# Patient Record
Sex: MALE | Race: WHITE | NOT HISPANIC OR LATINO | Employment: OTHER | URBAN - METROPOLITAN AREA
[De-identification: names, ages, dates, MRNs, and addresses within clinical notes are randomized per-mention and may not be internally consistent; named-entity substitution may affect disease eponyms.]

---

## 2018-01-04 ENCOUNTER — ANESTHESIA EVENT (OUTPATIENT)
Dept: SURGERY | Facility: CLINIC | Age: 78
DRG: 466 | End: 2018-01-04
Payer: MEDICARE

## 2018-01-04 RX ORDER — LOSARTAN POTASSIUM AND HYDROCHLOROTHIAZIDE 12.5; 1 MG/1; MG/1
1 TABLET ORAL DAILY
COMMUNITY

## 2018-01-04 RX ORDER — MULTIPLE VITAMINS W/ MINERALS TAB 9MG-400MCG
1 TAB ORAL DAILY
COMMUNITY

## 2018-01-05 ENCOUNTER — ANESTHESIA (OUTPATIENT)
Dept: SURGERY | Facility: CLINIC | Age: 78
DRG: 466 | End: 2018-01-05
Payer: MEDICARE

## 2018-01-05 ENCOUNTER — HOSPITAL ENCOUNTER (INPATIENT)
Facility: CLINIC | Age: 78
LOS: 3 days | Discharge: HOME HEALTH | DRG: 466 | End: 2018-01-08
Attending: ORTHOPAEDIC SURGERY | Admitting: ORTHOPAEDIC SURGERY
Payer: MEDICARE

## 2018-01-05 DIAGNOSIS — Z96.649 S/P REVISION OF TOTAL HIP: Primary | ICD-10-CM

## 2018-01-05 LAB
ABO + RH BLD: NORMAL
ABO + RH BLD: NORMAL
BLD GP AB SCN SERPL QL: NORMAL
BLOOD BANK CMNT PATIENT-IMP: NORMAL
POTASSIUM SERPL-SCNC: 3.5 MMOL/L (ref 3.4–5.3)
SPECIMEN EXP DATE BLD: NORMAL

## 2018-01-05 PROCEDURE — C1776 JOINT DEVICE (IMPLANTABLE): HCPCS | Performed by: ORTHOPAEDIC SURGERY

## 2018-01-05 PROCEDURE — 25000125 ZZHC RX 250: Performed by: ANESTHESIOLOGY

## 2018-01-05 PROCEDURE — 99233 SBSQ HOSP IP/OBS HIGH 50: CPT | Performed by: NURSE PRACTITIONER

## 2018-01-05 PROCEDURE — 25000566 ZZH SEVOFLURANE, EA 15 MIN: Performed by: ORTHOPAEDIC SURGERY

## 2018-01-05 PROCEDURE — 25000125 ZZHC RX 250: Performed by: ORTHOPAEDIC SURGERY

## 2018-01-05 PROCEDURE — 25000128 H RX IP 250 OP 636: Performed by: ANESTHESIOLOGY

## 2018-01-05 PROCEDURE — 25000128 H RX IP 250 OP 636: Performed by: NURSE ANESTHETIST, CERTIFIED REGISTERED

## 2018-01-05 PROCEDURE — 25000128 H RX IP 250 OP 636: Performed by: ORTHOPAEDIC SURGERY

## 2018-01-05 PROCEDURE — 86900 BLOOD TYPING SEROLOGIC ABO: CPT | Performed by: ANESTHESIOLOGY

## 2018-01-05 PROCEDURE — 25000125 ZZHC RX 250: Performed by: NURSE PRACTITIONER

## 2018-01-05 PROCEDURE — 71000012 ZZH RECOVERY PHASE 1 LEVEL 1 FIRST HR: Performed by: ORTHOPAEDIC SURGERY

## 2018-01-05 PROCEDURE — 0QU60KZ SUPPLEMENT RIGHT UPPER FEMUR WITH NONAUTOLOGOUS TISSUE SUBSTITUTE, OPEN APPROACH: ICD-10-PCS | Performed by: ORTHOPAEDIC SURGERY

## 2018-01-05 PROCEDURE — 36000067 ZZH SURGERY LEVEL 5 1ST 30 MIN: Performed by: ORTHOPAEDIC SURGERY

## 2018-01-05 PROCEDURE — 0QU40KZ SUPPLEMENT RIGHT ACETABULUM WITH NONAUTOLOGOUS TISSUE SUBSTITUTE, OPEN APPROACH: ICD-10-PCS | Performed by: ORTHOPAEDIC SURGERY

## 2018-01-05 PROCEDURE — 86901 BLOOD TYPING SEROLOGIC RH(D): CPT | Performed by: ANESTHESIOLOGY

## 2018-01-05 PROCEDURE — 27210794 ZZH OR GENERAL SUPPLY STERILE: Performed by: ORTHOPAEDIC SURGERY

## 2018-01-05 PROCEDURE — 0SUR09Z SUPPLEMENT RIGHT HIP JOINT, FEMORAL SURFACE WITH LINER, OPEN APPROACH: ICD-10-PCS | Performed by: ORTHOPAEDIC SURGERY

## 2018-01-05 PROCEDURE — 0QS604Z REPOSITION RIGHT UPPER FEMUR WITH INTERNAL FIXATION DEVICE, OPEN APPROACH: ICD-10-PCS | Performed by: ORTHOPAEDIC SURGERY

## 2018-01-05 PROCEDURE — 25000132 ZZH RX MED GY IP 250 OP 250 PS 637: Mod: GY | Performed by: NURSE PRACTITIONER

## 2018-01-05 PROCEDURE — 27210995 ZZH RX 272: Performed by: ORTHOPAEDIC SURGERY

## 2018-01-05 PROCEDURE — 0SP909Z REMOVAL OF LINER FROM RIGHT HIP JOINT, OPEN APPROACH: ICD-10-PCS | Performed by: ORTHOPAEDIC SURGERY

## 2018-01-05 PROCEDURE — 71000013 ZZH RECOVERY PHASE 1 LEVEL 1 EA ADDTL HR: Performed by: ORTHOPAEDIC SURGERY

## 2018-01-05 PROCEDURE — 86850 RBC ANTIBODY SCREEN: CPT | Performed by: ANESTHESIOLOGY

## 2018-01-05 PROCEDURE — 25000125 ZZHC RX 250: Performed by: NURSE ANESTHETIST, CERTIFIED REGISTERED

## 2018-01-05 PROCEDURE — 40000169 ZZH STATISTIC PRE-PROCEDURE ASSESSMENT I: Performed by: ORTHOPAEDIC SURGERY

## 2018-01-05 PROCEDURE — 25000132 ZZH RX MED GY IP 250 OP 250 PS 637: Mod: GY | Performed by: ORTHOPAEDIC SURGERY

## 2018-01-05 PROCEDURE — C1713 ANCHOR/SCREW BN/BN,TIS/BN: HCPCS | Performed by: ORTHOPAEDIC SURGERY

## 2018-01-05 PROCEDURE — 84132 ASSAY OF SERUM POTASSIUM: CPT | Performed by: ANESTHESIOLOGY

## 2018-01-05 PROCEDURE — 36415 COLL VENOUS BLD VENIPUNCTURE: CPT | Performed by: ANESTHESIOLOGY

## 2018-01-05 PROCEDURE — P9041 ALBUMIN (HUMAN),5%, 50ML: HCPCS | Performed by: NURSE ANESTHETIST, CERTIFIED REGISTERED

## 2018-01-05 PROCEDURE — 37000008 ZZH ANESTHESIA TECHNICAL FEE, 1ST 30 MIN: Performed by: ORTHOPAEDIC SURGERY

## 2018-01-05 PROCEDURE — 36000069 ZZH SURGERY LEVEL 5 EA 15 ADDTL MIN: Performed by: ORTHOPAEDIC SURGERY

## 2018-01-05 PROCEDURE — 12000007 ZZH R&B INTERMEDIATE

## 2018-01-05 PROCEDURE — 37000009 ZZH ANESTHESIA TECHNICAL FEE, EACH ADDTL 15 MIN: Performed by: ORTHOPAEDIC SURGERY

## 2018-01-05 PROCEDURE — C1762 CONN TISS, HUMAN(INC FASCIA): HCPCS | Performed by: ORTHOPAEDIC SURGERY

## 2018-01-05 PROCEDURE — A9270 NON-COVERED ITEM OR SERVICE: HCPCS | Mod: GY | Performed by: ORTHOPAEDIC SURGERY

## 2018-01-05 DEVICE — IMP HEAD FEMORAL BIOM MOD 36MM -3  11-363661: Type: IMPLANTABLE DEVICE | Site: HIP | Status: FUNCTIONAL

## 2018-01-05 DEVICE — IMP CABLE ACCORD GRIP 2MM FOR GRIP PLATES 71340020: Type: IMPLANTABLE DEVICE | Site: HIP | Status: FUNCTIONAL

## 2018-01-05 DEVICE — IMPLANTABLE DEVICE: Type: IMPLANTABLE DEVICE | Site: HIP | Status: FUNCTIONAL

## 2018-01-05 DEVICE — GRAFT BONE CRUSH CANC 30ML 400080: Type: IMPLANTABLE DEVICE | Site: HIP | Status: FUNCTIONAL

## 2018-01-05 DEVICE — GRAFT BONE PUTTY DBX 05ML 038050: Type: IMPLANTABLE DEVICE | Site: HIP | Status: FUNCTIONAL

## 2018-01-05 DEVICE — GRAFT BONE STRUT CORTICAL 10CMX20MM 400610: Type: IMPLANTABLE DEVICE | Site: HIP | Status: FUNCTIONAL

## 2018-01-05 RX ORDER — ACETAMINOPHEN 325 MG/1
975 TABLET ORAL EVERY 8 HOURS
Status: COMPLETED | OUTPATIENT
Start: 2018-01-05 | End: 2018-01-08

## 2018-01-05 RX ORDER — TRIAMCINOLONE ACETONIDE 1 MG/G
CREAM TOPICAL 2 TIMES DAILY PRN
Status: DISCONTINUED | OUTPATIENT
Start: 2018-01-05 | End: 2018-01-08 | Stop reason: HOSPADM

## 2018-01-05 RX ORDER — CEFAZOLIN SODIUM 1 G
1 VIAL (EA) INJECTION SEE ADMIN INSTRUCTIONS
Status: DISCONTINUED | OUTPATIENT
Start: 2018-01-05 | End: 2018-01-05 | Stop reason: HOSPADM

## 2018-01-05 RX ORDER — NEOSTIGMINE METHYLSULFATE 1 MG/ML
VIAL (ML) INJECTION PRN
Status: DISCONTINUED | OUTPATIENT
Start: 2018-01-05 | End: 2018-01-05

## 2018-01-05 RX ORDER — NALOXONE HYDROCHLORIDE 0.4 MG/ML
.1-.4 INJECTION, SOLUTION INTRAMUSCULAR; INTRAVENOUS; SUBCUTANEOUS
Status: ACTIVE | OUTPATIENT
Start: 2018-01-05 | End: 2018-01-06

## 2018-01-05 RX ORDER — DICLOFENAC SODIUM 1 MG/ML
1 SOLUTION/ DROPS OPHTHALMIC 4 TIMES DAILY PRN
Status: DISCONTINUED | OUTPATIENT
Start: 2018-01-05 | End: 2018-01-06

## 2018-01-05 RX ORDER — CICLOPIROX OLAMINE 7.7 MG/G
CREAM TOPICAL DAILY PRN
COMMUNITY

## 2018-01-05 RX ORDER — SODIUM CHLORIDE, SODIUM LACTATE, POTASSIUM CHLORIDE, CALCIUM CHLORIDE 600; 310; 30; 20 MG/100ML; MG/100ML; MG/100ML; MG/100ML
INJECTION, SOLUTION INTRAVENOUS CONTINUOUS
Status: DISCONTINUED | OUTPATIENT
Start: 2018-01-05 | End: 2018-01-05 | Stop reason: HOSPADM

## 2018-01-05 RX ORDER — FENTANYL CITRATE 50 UG/ML
25-100 INJECTION, SOLUTION INTRAMUSCULAR; INTRAVENOUS
Status: DISCONTINUED | OUTPATIENT
Start: 2018-01-05 | End: 2018-01-05 | Stop reason: HOSPADM

## 2018-01-05 RX ORDER — ONDANSETRON 2 MG/ML
4 INJECTION INTRAMUSCULAR; INTRAVENOUS EVERY 6 HOURS PRN
Status: DISCONTINUED | OUTPATIENT
Start: 2018-01-05 | End: 2018-01-08 | Stop reason: HOSPADM

## 2018-01-05 RX ORDER — AMLODIPINE BESYLATE 10 MG/1
10 TABLET ORAL EVERY EVENING
Status: DISCONTINUED | OUTPATIENT
Start: 2018-01-05 | End: 2018-01-08 | Stop reason: HOSPADM

## 2018-01-05 RX ORDER — GLYCOPYRROLATE 0.2 MG/ML
INJECTION, SOLUTION INTRAMUSCULAR; INTRAVENOUS PRN
Status: DISCONTINUED | OUTPATIENT
Start: 2018-01-05 | End: 2018-01-05

## 2018-01-05 RX ORDER — ALBUMIN, HUMAN INJ 5% 5 %
SOLUTION INTRAVENOUS CONTINUOUS PRN
Status: DISCONTINUED | OUTPATIENT
Start: 2018-01-05 | End: 2018-01-05

## 2018-01-05 RX ORDER — ONDANSETRON 4 MG/1
4 TABLET, ORALLY DISINTEGRATING ORAL EVERY 30 MIN PRN
Status: DISCONTINUED | OUTPATIENT
Start: 2018-01-05 | End: 2018-01-05 | Stop reason: HOSPADM

## 2018-01-05 RX ORDER — PROPOFOL 10 MG/ML
INJECTION, EMULSION INTRAVENOUS PRN
Status: DISCONTINUED | OUTPATIENT
Start: 2018-01-05 | End: 2018-01-05

## 2018-01-05 RX ORDER — MAGNESIUM HYDROXIDE 1200 MG/15ML
LIQUID ORAL PRN
Status: DISCONTINUED | OUTPATIENT
Start: 2018-01-05 | End: 2018-01-05 | Stop reason: HOSPADM

## 2018-01-05 RX ORDER — MOMETASONE FUROATE 1 MG/G
CREAM TOPICAL 2 TIMES DAILY PRN
Status: DISCONTINUED | OUTPATIENT
Start: 2018-01-05 | End: 2018-01-08 | Stop reason: HOSPADM

## 2018-01-05 RX ORDER — LABETALOL HYDROCHLORIDE 5 MG/ML
10 INJECTION, SOLUTION INTRAVENOUS
Status: DISCONTINUED | OUTPATIENT
Start: 2018-01-05 | End: 2018-01-05 | Stop reason: HOSPADM

## 2018-01-05 RX ORDER — NALOXONE HYDROCHLORIDE 0.4 MG/ML
.1-.4 INJECTION, SOLUTION INTRAMUSCULAR; INTRAVENOUS; SUBCUTANEOUS
Status: DISCONTINUED | OUTPATIENT
Start: 2018-01-05 | End: 2018-01-08 | Stop reason: HOSPADM

## 2018-01-05 RX ORDER — DEXAMETHASONE SODIUM PHOSPHATE 4 MG/ML
INJECTION, SOLUTION INTRA-ARTICULAR; INTRALESIONAL; INTRAMUSCULAR; INTRAVENOUS; SOFT TISSUE PRN
Status: DISCONTINUED | OUTPATIENT
Start: 2018-01-05 | End: 2018-01-05

## 2018-01-05 RX ORDER — FENTANYL CITRATE 50 UG/ML
25-50 INJECTION, SOLUTION INTRAMUSCULAR; INTRAVENOUS
Status: DISCONTINUED | OUTPATIENT
Start: 2018-01-05 | End: 2018-01-05 | Stop reason: HOSPADM

## 2018-01-05 RX ORDER — SODIUM CHLORIDE, SODIUM LACTATE, POTASSIUM CHLORIDE, CALCIUM CHLORIDE 600; 310; 30; 20 MG/100ML; MG/100ML; MG/100ML; MG/100ML
INJECTION, SOLUTION INTRAVENOUS CONTINUOUS
Status: DISCONTINUED | OUTPATIENT
Start: 2018-01-05 | End: 2018-01-06

## 2018-01-05 RX ORDER — HYDROMORPHONE HYDROCHLORIDE 1 MG/ML
.3-.5 INJECTION, SOLUTION INTRAMUSCULAR; INTRAVENOUS; SUBCUTANEOUS
Status: DISCONTINUED | OUTPATIENT
Start: 2018-01-05 | End: 2018-01-08 | Stop reason: HOSPADM

## 2018-01-05 RX ORDER — PROPARACAINE HYDROCHLORIDE 5 MG/ML
1 SOLUTION/ DROPS OPHTHALMIC ONCE
Status: DISCONTINUED | OUTPATIENT
Start: 2018-01-05 | End: 2018-01-05

## 2018-01-05 RX ORDER — EPHEDRINE SULFATE 50 MG/ML
INJECTION, SOLUTION INTRAMUSCULAR; INTRAVENOUS; SUBCUTANEOUS PRN
Status: DISCONTINUED | OUTPATIENT
Start: 2018-01-05 | End: 2018-01-05

## 2018-01-05 RX ORDER — CEFAZOLIN SODIUM 2 G/100ML
2 INJECTION, SOLUTION INTRAVENOUS
Status: COMPLETED | OUTPATIENT
Start: 2018-01-05 | End: 2018-01-05

## 2018-01-05 RX ORDER — METOPROLOL SUCCINATE 25 MG/1
25 TABLET, EXTENDED RELEASE ORAL DAILY
Status: DISCONTINUED | OUTPATIENT
Start: 2018-01-06 | End: 2018-01-08 | Stop reason: HOSPADM

## 2018-01-05 RX ORDER — HYDROXYZINE HYDROCHLORIDE 10 MG/1
10 TABLET, FILM COATED ORAL EVERY 6 HOURS PRN
Status: DISCONTINUED | OUTPATIENT
Start: 2018-01-05 | End: 2018-01-08 | Stop reason: HOSPADM

## 2018-01-05 RX ORDER — ACETAMINOPHEN 325 MG/1
975 TABLET ORAL EVERY 8 HOURS
Status: DISCONTINUED | OUTPATIENT
Start: 2018-01-05 | End: 2018-01-05

## 2018-01-05 RX ORDER — LIDOCAINE HYDROCHLORIDE 20 MG/ML
INJECTION, SOLUTION INFILTRATION; PERINEURAL PRN
Status: DISCONTINUED | OUTPATIENT
Start: 2018-01-05 | End: 2018-01-05

## 2018-01-05 RX ORDER — TRIAMCINOLONE ACETONIDE 1 MG/G
CREAM TOPICAL 2 TIMES DAILY PRN
COMMUNITY

## 2018-01-05 RX ORDER — VECURONIUM BROMIDE 1 MG/ML
INJECTION, POWDER, LYOPHILIZED, FOR SOLUTION INTRAVENOUS PRN
Status: DISCONTINUED | OUTPATIENT
Start: 2018-01-05 | End: 2018-01-05

## 2018-01-05 RX ORDER — HYDROMORPHONE HYDROCHLORIDE 1 MG/ML
.3-.5 INJECTION, SOLUTION INTRAMUSCULAR; INTRAVENOUS; SUBCUTANEOUS EVERY 5 MIN PRN
Status: DISCONTINUED | OUTPATIENT
Start: 2018-01-05 | End: 2018-01-05 | Stop reason: HOSPADM

## 2018-01-05 RX ORDER — AMOXICILLIN 250 MG
1-2 CAPSULE ORAL 2 TIMES DAILY
Status: DISCONTINUED | OUTPATIENT
Start: 2018-01-05 | End: 2018-01-08 | Stop reason: HOSPADM

## 2018-01-05 RX ORDER — ONDANSETRON 2 MG/ML
4 INJECTION INTRAMUSCULAR; INTRAVENOUS EVERY 30 MIN PRN
Status: DISCONTINUED | OUTPATIENT
Start: 2018-01-05 | End: 2018-01-05 | Stop reason: HOSPADM

## 2018-01-05 RX ORDER — ATORVASTATIN CALCIUM 40 MG/1
40 TABLET, FILM COATED ORAL EVERY EVENING
Status: DISCONTINUED | OUTPATIENT
Start: 2018-01-05 | End: 2018-01-08 | Stop reason: HOSPADM

## 2018-01-05 RX ORDER — ERYTHROMYCIN 5 MG/G
OINTMENT OPHTHALMIC 4 TIMES DAILY
Status: DISPENSED | OUTPATIENT
Start: 2018-01-05 | End: 2018-01-06

## 2018-01-05 RX ORDER — ONDANSETRON 4 MG/1
4 TABLET, ORALLY DISINTEGRATING ORAL EVERY 6 HOURS PRN
Status: DISCONTINUED | OUTPATIENT
Start: 2018-01-05 | End: 2018-01-08 | Stop reason: HOSPADM

## 2018-01-05 RX ORDER — LIDOCAINE 40 MG/G
CREAM TOPICAL
Status: DISCONTINUED | OUTPATIENT
Start: 2018-01-05 | End: 2018-01-08 | Stop reason: HOSPADM

## 2018-01-05 RX ORDER — HYDROMORPHONE HYDROCHLORIDE 2 MG/1
2-4 TABLET ORAL EVERY 4 HOURS PRN
Status: DISCONTINUED | OUTPATIENT
Start: 2018-01-05 | End: 2018-01-08 | Stop reason: HOSPADM

## 2018-01-05 RX ORDER — ACETAMINOPHEN 325 MG/1
650 TABLET ORAL EVERY 4 HOURS PRN
Status: DISCONTINUED | OUTPATIENT
Start: 2018-01-08 | End: 2018-01-08 | Stop reason: HOSPADM

## 2018-01-05 RX ORDER — ONDANSETRON 2 MG/ML
INJECTION INTRAMUSCULAR; INTRAVENOUS PRN
Status: DISCONTINUED | OUTPATIENT
Start: 2018-01-05 | End: 2018-01-05

## 2018-01-05 RX ORDER — LOSARTAN POTASSIUM AND HYDROCHLOROTHIAZIDE 12.5; 1 MG/1; MG/1
1 TABLET ORAL DAILY
Status: DISCONTINUED | OUTPATIENT
Start: 2018-01-06 | End: 2018-01-08 | Stop reason: HOSPADM

## 2018-01-05 RX ORDER — FENTANYL CITRATE 50 UG/ML
INJECTION, SOLUTION INTRAMUSCULAR; INTRAVENOUS PRN
Status: DISCONTINUED | OUTPATIENT
Start: 2018-01-05 | End: 2018-01-05

## 2018-01-05 RX ORDER — MOMETASONE FUROATE 1 MG/G
CREAM TOPICAL 2 TIMES DAILY PRN
COMMUNITY

## 2018-01-05 RX ADMIN — VECURONIUM BROMIDE 3 MG: 1 INJECTION, POWDER, LYOPHILIZED, FOR SOLUTION INTRAVENOUS at 15:26

## 2018-01-05 RX ADMIN — HYDROMORPHONE HYDROCHLORIDE 0.5 MG: 1 INJECTION, SOLUTION INTRAMUSCULAR; INTRAVENOUS; SUBCUTANEOUS at 14:55

## 2018-01-05 RX ADMIN — TRANEXAMIC ACID 1 G: 100 INJECTION, SOLUTION INTRAVENOUS at 13:42

## 2018-01-05 RX ADMIN — TRANEXAMIC ACID 1 G: 100 INJECTION, SOLUTION INTRAVENOUS at 16:37

## 2018-01-05 RX ADMIN — SODIUM CHLORIDE, POTASSIUM CHLORIDE, SODIUM LACTATE AND CALCIUM CHLORIDE: 600; 310; 30; 20 INJECTION, SOLUTION INTRAVENOUS at 14:21

## 2018-01-05 RX ADMIN — Medication 0.5 MG: at 17:37

## 2018-01-05 RX ADMIN — Medication 5 MG: at 14:04

## 2018-01-05 RX ADMIN — DEXAMETHASONE SODIUM PHOSPHATE 4 MG: 4 INJECTION, SOLUTION INTRA-ARTICULAR; INTRALESIONAL; INTRAMUSCULAR; INTRAVENOUS; SOFT TISSUE at 14:23

## 2018-01-05 RX ADMIN — CEFAZOLIN SODIUM 2 G: 2 INJECTION, SOLUTION INTRAVENOUS at 13:38

## 2018-01-05 RX ADMIN — SODIUM CHLORIDE, POTASSIUM CHLORIDE, SODIUM LACTATE AND CALCIUM CHLORIDE: 600; 310; 30; 20 INJECTION, SOLUTION INTRAVENOUS at 11:55

## 2018-01-05 RX ADMIN — VECURONIUM BROMIDE 2 MG: 1 INJECTION, POWDER, LYOPHILIZED, FOR SOLUTION INTRAVENOUS at 15:00

## 2018-01-05 RX ADMIN — ROCURONIUM BROMIDE 50 MG: 10 INJECTION INTRAVENOUS at 13:38

## 2018-01-05 RX ADMIN — LIDOCAINE HYDROCHLORIDE 1 ML: 10 INJECTION, SOLUTION EPIDURAL; INFILTRATION; INTRACAUDAL; PERINEURAL at 11:55

## 2018-01-05 RX ADMIN — PHENYLEPHRINE HYDROCHLORIDE 100 MCG: 10 INJECTION INTRAVENOUS at 13:50

## 2018-01-05 RX ADMIN — DEXTRAN 70, AND HYPROMELLOSE 2910 1 DROP: 1; 3 SOLUTION/ DROPS OPHTHALMIC at 22:00

## 2018-01-05 RX ADMIN — PROPOFOL 200 MG: 10 INJECTION, EMULSION INTRAVENOUS at 13:38

## 2018-01-05 RX ADMIN — SODIUM CHLORIDE, POTASSIUM CHLORIDE, SODIUM LACTATE AND CALCIUM CHLORIDE: 600; 310; 30; 20 INJECTION, SOLUTION INTRAVENOUS at 22:58

## 2018-01-05 RX ADMIN — GLYCOPYRROLATE 0.8 MG: 0.2 INJECTION, SOLUTION INTRAMUSCULAR; INTRAVENOUS at 16:53

## 2018-01-05 RX ADMIN — FENTANYL CITRATE 50 MCG: 50 INJECTION, SOLUTION INTRAMUSCULAR; INTRAVENOUS at 13:38

## 2018-01-05 RX ADMIN — Medication 0.5 MG: at 18:10

## 2018-01-05 RX ADMIN — ALBUMIN (HUMAN): 12.5 SOLUTION INTRAVENOUS at 15:37

## 2018-01-05 RX ADMIN — CEFAZOLIN SODIUM 2 G: 2 SOLUTION INTRAVENOUS at 22:53

## 2018-01-05 RX ADMIN — FENTANYL CITRATE 50 MCG: 50 INJECTION, SOLUTION INTRAMUSCULAR; INTRAVENOUS at 14:05

## 2018-01-05 RX ADMIN — LIDOCAINE HYDROCHLORIDE 100 MG: 20 INJECTION, SOLUTION INFILTRATION; PERINEURAL at 13:38

## 2018-01-05 RX ADMIN — MIDAZOLAM 1 MG: 1 INJECTION INTRAMUSCULAR; INTRAVENOUS at 13:28

## 2018-01-05 RX ADMIN — FENTANYL CITRATE 50 MCG: 50 INJECTION, SOLUTION INTRAMUSCULAR; INTRAVENOUS at 16:09

## 2018-01-05 RX ADMIN — SENNOSIDES AND DOCUSATE SODIUM 1 TABLET: 8.6; 5 TABLET ORAL at 21:51

## 2018-01-05 RX ADMIN — ALBUMIN (HUMAN): 12.5 SOLUTION INTRAVENOUS at 15:23

## 2018-01-05 RX ADMIN — AMLODIPINE BESYLATE 10 MG: 10 TABLET ORAL at 21:51

## 2018-01-05 RX ADMIN — PHENYLEPHRINE HYDROCHLORIDE 100 MCG: 10 INJECTION INTRAVENOUS at 13:57

## 2018-01-05 RX ADMIN — ASPIRIN 325 MG: 325 TABLET, DELAYED RELEASE ORAL at 21:51

## 2018-01-05 RX ADMIN — PHENYLEPHRINE HYDROCHLORIDE 100 MCG: 10 INJECTION INTRAVENOUS at 14:19

## 2018-01-05 RX ADMIN — NEOSTIGMINE METHYLSULFATE 5 MG: 1 INJECTION INTRAMUSCULAR; INTRAVENOUS; SUBCUTANEOUS at 16:53

## 2018-01-05 RX ADMIN — CEFAZOLIN 1 G: 1 INJECTION, POWDER, FOR SOLUTION INTRAMUSCULAR; INTRAVENOUS at 15:33

## 2018-01-05 RX ADMIN — VECURONIUM BROMIDE 2 MG: 1 INJECTION, POWDER, LYOPHILIZED, FOR SOLUTION INTRAVENOUS at 14:30

## 2018-01-05 RX ADMIN — SODIUM CHLORIDE, POTASSIUM CHLORIDE, SODIUM LACTATE AND CALCIUM CHLORIDE: 600; 310; 30; 20 INJECTION, SOLUTION INTRAVENOUS at 16:33

## 2018-01-05 RX ADMIN — ACETAMINOPHEN 975 MG: 325 TABLET, FILM COATED ORAL at 21:51

## 2018-01-05 RX ADMIN — ERYTHROMYCIN 1 G: 5 OINTMENT OPHTHALMIC at 22:54

## 2018-01-05 RX ADMIN — MIDAZOLAM 1 MG: 1 INJECTION INTRAMUSCULAR; INTRAVENOUS at 13:34

## 2018-01-05 RX ADMIN — ONDANSETRON 4 MG: 2 INJECTION INTRAMUSCULAR; INTRAVENOUS at 16:36

## 2018-01-05 RX ADMIN — PHENYLEPHRINE HYDROCHLORIDE 0.5 MCG/KG/MIN: 10 INJECTION, SOLUTION INTRAMUSCULAR; INTRAVENOUS; SUBCUTANEOUS at 14:25

## 2018-01-05 RX ADMIN — VECURONIUM BROMIDE 3 MG: 1 INJECTION, POWDER, LYOPHILIZED, FOR SOLUTION INTRAVENOUS at 16:01

## 2018-01-05 RX ADMIN — Medication 10 MG: at 15:36

## 2018-01-05 RX ADMIN — ATORVASTATIN CALCIUM 40 MG: 40 TABLET, FILM COATED ORAL at 21:51

## 2018-01-05 RX ADMIN — Medication 5 MG: at 14:12

## 2018-01-05 ASSESSMENT — ACTIVITIES OF DAILY LIVING (ADL): RETIRED_COMMUNICATION: 0-->UNDERSTANDS/COMMUNICATES WITHOUT DIFFICULTY

## 2018-01-05 ASSESSMENT — LIFESTYLE VARIABLES: TOBACCO_USE: 1

## 2018-01-05 NOTE — IP AVS SNAPSHOT
[address] [facility]    [address] [place] [address]    Phone:  [phone]                                       After Visit Summary   1/5/2018    Mitch Gabriel    MRN: [id number]           After Visit Summary Signature Page     I have received my discharge instructions, and my questions have been answered. I have discussed any challenges I see with this plan with the nurse or doctor.    ..........................................................................................................................................  Patient/Patient Representative Signature      ..........................................................................................................................................  Patient Representative Print Name and Relationship to Patient    ..................................................               ................................................  Date                                            Time    ..........................................................................................................................................  Reviewed by Signature/Title    ...................................................              ..............................................  Date                                                            Time

## 2018-01-05 NOTE — PROGRESS NOTES
Admission medication history interview status for the 1/5/2018  admission is complete. See EPIC admission navigator for prior to admission medications     Medication history source reliability:Good    Medication history interview source(s):Patient    Medication history resources (including written lists, pill bottles, clinic record):Patient mailed in his medication list prior to surgery    Primary pharmacy.[facility]    Additional medication history information not noted on PTA med list :None    Time spent in this activity: 40 minutes    Prior to Admission medications    Medication Sig Last Dose Taking? Auth Provider   CALCIUM PO Take 250 mg by mouth daily 12/30/2017 at AM Yes Reported, Patient   triamcinolone (KENALOG) 0.1 % cream Apply topically 2 times daily as needed for irritation 12/29/2017 at PRN Yes Reported, Patient   Acetaminophen (TYLENOL ARTHRITIS PAIN PO) Take 1,300 mg by mouth 3 times daily as needed 12/30/2017 at PRN Yes Reported, Patient   ciclopirox (LOPROX) 0.77 % cream Apply topically daily as needed 12/29/2017 at PRN Yes Reported, Patient   mometasone (ELOCON) 0.1 % cream Apply topically 2 times daily as needed 12/29/2017 at PRN Yes Reported, Patient   Atorvastatin Calcium (LIPITOR PO) Take 40 mg by mouth every evening  1/4/2018 at PM Yes Reported, Patient   Metoprolol Succinate (TOPROL XL PO) Take 25 mg by mouth daily 1/5/2018 at 0730 Yes Reported, Patient   losartan-hydrochlorothiazide (HYZAAR) 100-12.5 MG per tablet Take 1 tablet by mouth daily 1/5/2018 at 0730 Yes Reported, Patient   AmLODIPine Besylate (NORVASC PO) Take 10 mg by mouth every evening  1/4/2018 at PM Yes Reported, Patient   ASPIRIN PO Take 81 mg by mouth daily 12/29/2017 at AM Yes Reported, Patient   VITAMIN D, CHOLECALCIFEROL, PO Take 5,000 Units by mouth daily 12/29/2017 at AM Yes Reported, Patient   multivitamin, therapeutic with minerals (MULTI-VITAMIN) TABS tablet Take 1 tablet by mouth daily 12/29/2017 at AM Yes  Reported, Patient   Multiple Vitamins-Minerals (PRESERVISION/LUTEIN PO) Take 2 tablets by mouth daily 12/29/2017 at AM Yes Reported, Patient   Sildenafil Citrate (VIAGRA PO) Take 100 mg by mouth daily as needed Past Month at PRN Yes Reported, Patient   Omega-3 Fatty Acids (FISH OIL PO) Take 360 mg by mouth daily 12/29/2017 at AM Yes Reported, Patient

## 2018-01-05 NOTE — IP AVS SNAPSHOT
MRN:[id number]                      After Visit Summary   1/5/2018    Mitch Gabriel    MRN: [id number]           Thank you!     Thank you for choosing [facility] for your care. Our goal is always to provide you with excellent care. Hearing back from our patients is one way we can continue to improve our services. Please take a few minutes to complete the written survey that you may receive in the mail after you visit with us. Thank you!        Patient Information     Date Of Birth          1940        Designated Caregiver       Most Recent Value    Caregiver    Will someone help with your care after discharge? yes    Name of designated caregiver Swapnil (wife)     Phone number of caregiver     Caregiver address [facility]      About your hospital stay     You were admitted on:  January 5, 2018 You last received care in the:  [place] [address] [facility]    You were discharged on:  January 8, 2018        Reason for your hospital stay       Patient admitted s/p Rev RTHA. He progressed well throughout his stay in the hospital and worked well with physical therapy. His pain was managed with po medications and he received 24 hours of IV abx post procedure.                  Who to Call     For medical emergencies, please call 911.  For non-urgent questions about your medical care, please call your primary care provider or clinic, [phone]  For questions related to your surgery, please call your surgery clinic        Attending Provider     Provider Specialty    Sherri Cabrera MD Orthopedics       Primary Care Provider Office Phone # Fax #    Aguilar Wesley [phone]      After Care Instructions     Diet       Follow this diet upon discharge: Orders Placed This Encounter      Advance Diet as Tolerated: Regular Diet Adult            Discharge Instructions       Patient is to slowly progress back into their activities over the next several weeks.    They are to keep the [place] dressing in place at all times and do not remove it. You can shower but try and keep the incision site covered and do not get it wet. Do not at any point in time, submerge the incision or soak the incision. Wear TENZIN stockings for total of 3 weeks after surgery.  Monitor the wound closely for any foul smelling or abnormal discharge. Any temperatures over 101.5 with chills, or any redness about the incision please contact our office immediately. If you experience any chest pains, or shortness of breath, go directly to the emergency department.   Follow up with Dr. Cabrera in approximately 2 weeks and call if you have any complaints between now and your follow up.   Call [phone] to schedule your follow up if you do not already have one scheduled.   Maintain hip precautions with no hip abduction until we clear you to do so. Wear the hip abduction brace at all times except when you are sleeping.   Maintain the toe touch weight bearing status for at least 6 weeks.   Make sure that you have someone with you when you get up and move around the house and make sure you use some form of ambulatory assistance at all times, preferably a walker.   Take aspirin for prevention of blood clots  Take dilaudid for pain control  Take senokot to help with constipation  Take atarax to help with spasms/itching/relaxation  Take zofran to help with nausea                  Follow-up Appointments     Follow-up and recommended labs and tests        Patient already has a follow up scheduled with us on 1/17/18. He will call if he has any complaints between now and then.                  Additional Services     Home Care PT Referral for Hospital Discharge       PT to eval and treat    Your provider has ordered home care - physical therapy. If you have not been contacted within 2 days of your discharge please call the department phone number listed on the top of this document.            Home Care PT Referral  "for Hospital Discharge       PT to eval and treat    Your provider has ordered home care - physical therapy. If you have not been contacted within 2 days of your discharge please call the department phone number listed on the top of this document.                  Pending Results     No orders found from 1/3/2018 to 2018.            Statement of Approval     Ordered          18 0756  I have reviewed and agree with all the recommendations and orders detailed in this document.     Approved and electronically signed by:  Jenaro Recinos PA-C             Admission Information     Date & Time Provider Department Dept. Phone    2018 Sherri Cabrera MD [place] [address] [facility] [phone]      Your Vitals Were     Blood Pressure Pulse Temperature Respirations Height Weight    117/68 (BP Location: Right arm) 67 98  F (36.7  C) 16 1.791 m (5' 10.5\") 96.3 kg (212 lb 3.2 oz)    Pulse Oximetry BMI (Body Mass Index)                95% 30.02 kg/m2          [email or web address] Information     [employer] lets you send messages to your doctor, view your test results, renew your prescriptions, schedule appointments and more. To sign up, go to [email or web address] . Click on \"Log in\" on the left side of the screen, which will take you to the Welcome page. Then click on \"Sign up Now\" on the right side of the page.     You will be asked to enter the access code listed below, as well as some personal information. Please follow the directions to create your username and password.     Your access code is: [email or web address]  Expires: 2018  9:49 AM     Your access code will  in 90 days. If you need help or a new code, please call your [facility] clinic or [phone].        Care EveryWhere ID     This is your Care EveryWhere ID. This could be used by other organizations to access your [facility] medical records  [id number]        Equal Access to Services     BOBY RODRIGUEZ AH: Kinsey Ellis, " waanaliliada lurichadaha, qaybta kaalveronika vo, ana maria mccormickaan ah. So [facility] [phone].    ATENCIÓN: Si louis jain, tiene a castillo disposición servicios gratuitos de asistencia lingüística. Dominique al [phone].    We comply with applicable federal civil rights laws and [place] laws. We do not discriminate on the basis of race, color, national origin, age, disability, sex, sexual orientation, or gender identity.               Review of your medicines      START taking        Dose / Directions    HYDROmorphone 2 MG tablet   Commonly known as:  DILAUDID        Dose:  2-4 mg   Take 1-2 tablets (2-4 mg) by mouth every 4 hours as needed for moderate to severe pain   Quantity:  40 tablet   Refills:  0       hydrOXYzine 10 MG tablet   Commonly known as:  ATARAX   Notes to Patient:  Resume after discharge        Dose:  10 mg   Take 1 tablet (10 mg) by mouth every 6 hours as needed for itching   Quantity:  30 tablet   Refills:  0       ondansetron 4 MG ODT tab   Commonly known as:  ZOFRAN-ODT        Dose:  4 mg   Take 1 tablet (4 mg) by mouth every 6 hours as needed for nausea or vomiting   Quantity:  30 tablet   Refills:  0       senna-docusate 8.6-50 MG per tablet   Commonly known as:  SENOKOT-S;PERICOLACE        Dose:  1-2 tablet   Take 1-2 tablets by mouth 2 times daily   Quantity:  40 tablet   Refills:  0         CONTINUE these medicines which may have CHANGED, or have new prescriptions. If we are uncertain of the size of tablets/capsules you have at home, strength may be listed as something that might have changed.        Dose / Directions    aspirin 325 MG EC tablet   This may have changed:    - medication strength  - how much to take  - when to take this        Dose:  325 mg   Take 1 tablet (325 mg) by mouth 2 times daily   Quantity:  60 tablet   Refills:  0         CONTINUE these medicines which have NOT CHANGED        Dose / Directions    CALCIUM PO   Notes to Patient:  Resume after discharge         Dose:  250 mg   Take 250 mg by mouth daily   Refills:  0       ciclopirox 0.77 % cream   Commonly known as:  LOPROX   Notes to Patient:  Resume after discharge        Apply topically daily as needed   Refills:  0       FISH OIL PO   Notes to Patient:  Resume after discharge        Dose:  360 mg   Take 360 mg by mouth daily   Refills:  0       LIPITOR PO        Dose:  40 mg   Take 40 mg by mouth every evening   Refills:  0       losartan-hydrochlorothiazide 100-12.5 MG per tablet   Commonly known as:  HYZAAR        Dose:  1 tablet   Take 1 tablet by mouth daily   Refills:  0       mometasone 0.1 % cream   Commonly known as:  ELOCON   Notes to Patient:  Resume after discharge        Apply topically 2 times daily as needed   Refills:  0       * Multi-vitamin Tabs tablet   Notes to Patient:  Resume after discharge        Dose:  1 tablet   Take 1 tablet by mouth daily   Refills:  0       * PRESERVISION/LUTEIN PO   Notes to Patient:  Resume after discharge        Dose:  2 tablet   Take 2 tablets by mouth daily   Refills:  0       NORVASC PO        Dose:  10 mg   Take 10 mg by mouth every evening   Refills:  0       TOPROL XL PO   Notes to Patient:  Resume after discharge        Dose:  25 mg   Take 25 mg by mouth daily   Refills:  0       triamcinolone 0.1 % cream   Commonly known as:  KENALOG   Notes to Patient:  Resume after discharge        Apply topically 2 times daily as needed for irritation   Refills:  0       TYLENOL ARTHRITIS PAIN PO   Notes to Patient:  Resume after discharge        Dose:  1300 mg   Take 1,300 mg by mouth 3 times daily as needed   Refills:  0       VIAGRA PO   Notes to Patient:  Resume after discharge        Dose:  100 mg   Take 100 mg by mouth daily as needed   Refills:  0       VITAMIN D (CHOLECALCIFEROL) PO   Notes to Patient:  Resume after discharge        Dose:  5000 Units   Take 5,000 Units by mouth daily   Refills:  0       * Notice:  This list has 2 medication(s) that are the same  as other medications prescribed for you. Read the directions carefully, and ask your doctor or other care provider to review them with you.         Where to get your medicines      These medications were sent to [facility] [place], [place] - [address]  [address] [place] [address]     Phone:  [phone]     aspirin 325 MG EC tablet    hydrOXYzine 10 MG tablet    ondansetron 4 MG ODT tab    senna-docusate 8.6-50 MG per tablet         Some of these will need a paper prescription and others can be bought over the counter. Ask your nurse if you have questions.     Bring a paper prescription for each of these medications     HYDROmorphone 2 MG tablet                Protect others around you: Learn how to safely use, store and throw away your medicines at www.disposemymeds.org.             Medication List: This is a list of all your medications and when to take them. Check marks below indicate your daily home schedule. Keep this list as a reference.      Medications           Morning Afternoon Evening Bedtime As Needed    aspirin 325 MG EC tablet   Take 1 tablet (325 mg) by mouth 2 times daily   Last time this was given:  325 mg on 1/8/2018  8:41 AM   Next Dose Due:  1/8/18                                     CALCIUM PO   Take 250 mg by mouth daily   Notes to Patient:  Resume after discharge                                ciclopirox 0.77 % cream   Commonly known as:  LOPROX   Apply topically daily as needed   Notes to Patient:  Resume after discharge                                FISH OIL PO   Take 360 mg by mouth daily   Notes to Patient:  Resume after discharge                                HYDROmorphone 2 MG tablet   Commonly known as:  DILAUDID   Take 1-2 tablets (2-4 mg) by mouth every 4 hours as needed for moderate to severe pain   Last time this was given:  2 mg on 1/8/2018 12:42 PM                                   hydrOXYzine 10 MG tablet   Commonly known as:  ATARAX   Take 1  tablet (10 mg) by mouth every 6 hours as needed for itching   Notes to Patient:  Resume after discharge                                LIPITOR PO   Take 40 mg by mouth every evening   Last time this was given:  40 mg on 1/7/2018  9:31 PM   Next Dose Due:  1/8/18                                     losartan-hydrochlorothiazide 100-12.5 MG per tablet   Commonly known as:  HYZAAR   Take 1 tablet by mouth daily   Last time this was given:  1 tablet on 1/8/2018  8:40 AM   Next Dose Due:  1/9/18                                   mometasone 0.1 % cream   Commonly known as:  ELOCON   Apply topically 2 times daily as needed   Notes to Patient:  Resume after discharge                                * Multi-vitamin Tabs tablet   Take 1 tablet by mouth daily   Notes to Patient:  Resume after discharge                                * PRESERVISION/LUTEIN PO   Take 2 tablets by mouth daily   Notes to Patient:  Resume after discharge                                NORVASC PO   Take 10 mg by mouth every evening   Last time this was given:  10 mg on 1/7/2018  9:33 PM   Next Dose Due:  1/8/17                                   ondansetron 4 MG ODT tab   Commonly known as:  ZOFRAN-ODT   Take 1 tablet (4 mg) by mouth every 6 hours as needed for nausea or vomiting                                   senna-docusate 8.6-50 MG per tablet   Commonly known as:  SENOKOT-S;PERICOLACE   Take 1-2 tablets by mouth 2 times daily   Last time this was given:  2 tablets on 1/8/2018  8:41 AM   Next Dose Due:  1/8/18                                   TOPROL XL PO   Take 25 mg by mouth daily   Last time this was given:  25 mg on 1/8/2018  8:41 AM   Notes to Patient:  Resume after discharge                                triamcinolone 0.1 % cream   Commonly known as:  KENALOG   Apply topically 2 times daily as needed for irritation   Notes to Patient:  Resume after discharge                                TYLENOL ARTHRITIS PAIN PO   Take 1,300 mg by mouth 3  times daily as needed   Notes to Patient:  Resume after discharge                                VIAGRA PO   Take 100 mg by mouth daily as needed   Notes to Patient:  Resume after discharge                                VITAMIN D (CHOLECALCIFEROL) PO   Take 5,000 Units by mouth daily   Notes to Patient:  Resume after discharge                                * Notice:  This list has 2 medication(s) that are the same as other medications prescribed for you. Read the directions carefully, and ask your doctor or other care provider to review them with you.

## 2018-01-05 NOTE — ANESTHESIA PREPROCEDURE EVALUATION
Anesthesia Evaluation     . Pt has had prior anesthetic.     No history of anesthetic complications          ROS/MED HX    ENT/Pulmonary:     (+)sleep apnea, tobacco use, Past use uses CPAP , . .    Neurologic:     (+)other neuro Bell's palsy, prolactinoma, has been followed for 25yrs with no change    Cardiovascular:     (+) Dyslipidemia, hypertension-range: well controlled, ---. : . . . :. .       METS/Exercise Tolerance:     Hematologic:     (+) Other Hematologic Disorder-psoriasis      Musculoskeletal:         GI/Hepatic:        (-) GERD   Renal/Genitourinary:     (+) chronic renal disease, type: CRI,       Endo:         Psychiatric:         Infectious Disease:         Malignancy:         Other:                     Physical Exam  Normal systems: cardiovascular and pulmonary    Airway   Mallampati: III  TM distance: >3 FB  Neck ROM: full    Dental   (+) missing    Cardiovascular       Pulmonary                     Anesthesia Plan      History & Physical Review  History and physical reviewed and following examination; no interval change.    ASA Status:  3 .    NPO Status:  > 8 hours    Plan for General and ETT with Intravenous induction. Maintenance will be Balanced.    PONV prophylaxis:  Ondansetron (or other 5HT-3) and Dexamethasone or Solumedrol  Additional equipment: Videolaryngoscope      Postoperative Care  Postoperative pain management:  IV analgesics.      Consents  Anesthetic plan, risks, benefits and alternatives discussed with:  Patient..                          .

## 2018-01-05 NOTE — ANESTHESIA CARE TRANSFER NOTE
Patient: Mitch Gabriel    Procedure(s):  RIGHT OPEN REDUCTION INTERNAL FIXATION OF THE GREATER TROCH FRACTURE, REVISION TOTAL HIP ARTHROPLASTY - Wound Class: I-Clean    Diagnosis: RIGHT TOTAL KNEE HARDWARE LOOSENING   Diagnosis Additional Information: No value filed.    Anesthesia Type:   General, ETT     Note:  Airway :Face Mask  Patient transferred to:PACU  Comments: Patient spontaneously breathing and following commands. VSS. Report given to RN.Handoff Report: Identifed the Patient, Identified the Reponsible Provider, Reviewed the pertinent medical history, Discussed the surgical course, Reviewed Intra-OP anesthesia mangement and issues during anesthesia, Set expectations for post-procedure period and Allowed opportunity for questions and acknowledgement of understanding      Vitals: (Last set prior to Anesthesia Care Transfer)    CRNA VITALS  1/5/2018 1639 - 1/5/2018 1718      1/5/2018             NIBP: 126/89    NIBP Mean: 100                Electronically Signed By: RAYO Lorenzo CRNA  January 5, 2018  5:18 PM

## 2018-01-05 NOTE — BRIEF OP NOTE
[facility] Brief Operative Note    Pre-operative diagnosis: RIGHT TOTAL HIP HARDWARE LOOSENING    Post-operative diagnosis * No post-op diagnosis entered *  Loosening of right hip   Procedure: Procedure(s):  RIGHT OPEN REDUCTION INTERNAL FIXATION OF THE GREATER TROCH FRACTURE, REVISION TOTAL HIP ARTHROPLASTY - Wound Class: I-Clean   Surgeon(s): Surgeon(s) and Role:     * Sherri Cabrera MD - Primary     * Jenaro Recinos PA-C - Assisting   Estimated blood loss: 800 mL    Specimens: * No specimens in log *   Findings: Loosening of right total hip replacement with ORIF of greater trochanter fracture.

## 2018-01-06 ENCOUNTER — APPOINTMENT (OUTPATIENT)
Dept: PHYSICAL THERAPY | Facility: CLINIC | Age: 78
DRG: 466 | End: 2018-01-06
Attending: ORTHOPAEDIC SURGERY
Payer: MEDICARE

## 2018-01-06 LAB — HGB BLD-MCNC: 11.4 G/DL (ref 13.3–17.7)

## 2018-01-06 PROCEDURE — 25000125 ZZHC RX 250: Performed by: NURSE PRACTITIONER

## 2018-01-06 PROCEDURE — 97116 GAIT TRAINING THERAPY: CPT | Mod: GP

## 2018-01-06 PROCEDURE — 97530 THERAPEUTIC ACTIVITIES: CPT | Mod: GP

## 2018-01-06 PROCEDURE — 40000193 ZZH STATISTIC PT WARD VISIT

## 2018-01-06 PROCEDURE — 25000132 ZZH RX MED GY IP 250 OP 250 PS 637: Mod: GY | Performed by: ORTHOPAEDIC SURGERY

## 2018-01-06 PROCEDURE — 97110 THERAPEUTIC EXERCISES: CPT | Mod: GP

## 2018-01-06 PROCEDURE — 97161 PT EVAL LOW COMPLEX 20 MIN: CPT | Mod: GP

## 2018-01-06 PROCEDURE — 85018 HEMOGLOBIN: CPT | Performed by: ORTHOPAEDIC SURGERY

## 2018-01-06 PROCEDURE — A9270 NON-COVERED ITEM OR SERVICE: HCPCS | Mod: GY | Performed by: ORTHOPAEDIC SURGERY

## 2018-01-06 PROCEDURE — 25000128 H RX IP 250 OP 636: Performed by: ORTHOPAEDIC SURGERY

## 2018-01-06 PROCEDURE — 12000007 ZZH R&B INTERMEDIATE

## 2018-01-06 PROCEDURE — 36415 COLL VENOUS BLD VENIPUNCTURE: CPT | Performed by: ORTHOPAEDIC SURGERY

## 2018-01-06 RX ADMIN — HYDROMORPHONE HYDROCHLORIDE 2 MG: 2 TABLET ORAL at 10:46

## 2018-01-06 RX ADMIN — ASPIRIN 325 MG: 325 TABLET, DELAYED RELEASE ORAL at 20:00

## 2018-01-06 RX ADMIN — ASPIRIN 325 MG: 325 TABLET, DELAYED RELEASE ORAL at 08:20

## 2018-01-06 RX ADMIN — ACETAMINOPHEN 975 MG: 325 TABLET, FILM COATED ORAL at 21:18

## 2018-01-06 RX ADMIN — SENNOSIDES AND DOCUSATE SODIUM 2 TABLET: 8.6; 5 TABLET ORAL at 20:00

## 2018-01-06 RX ADMIN — METOPROLOL SUCCINATE 25 MG: 25 TABLET, EXTENDED RELEASE ORAL at 08:20

## 2018-01-06 RX ADMIN — ACETAMINOPHEN 975 MG: 325 TABLET, FILM COATED ORAL at 06:35

## 2018-01-06 RX ADMIN — HYDROMORPHONE HYDROCHLORIDE 2 MG: 2 TABLET ORAL at 21:19

## 2018-01-06 RX ADMIN — CHOLECALCIFEROL CAP 125 MCG (5000 UNIT) 5000 UNITS: 125 CAP at 08:20

## 2018-01-06 RX ADMIN — DEXTRAN 70, AND HYPROMELLOSE 2910 1 DROP: 1; 3 SOLUTION/ DROPS OPHTHALMIC at 18:52

## 2018-01-06 RX ADMIN — ACETAMINOPHEN 975 MG: 325 TABLET, FILM COATED ORAL at 13:16

## 2018-01-06 RX ADMIN — CEFAZOLIN SODIUM 2 G: 2 SOLUTION INTRAVENOUS at 06:35

## 2018-01-06 RX ADMIN — LOSARTAN POTASSIUM AND HYDROCHLOROTHIAZIDE 1 TABLET: 100; 12.5 TABLET, FILM COATED ORAL at 08:23

## 2018-01-06 RX ADMIN — DEXTRAN 70, AND HYPROMELLOSE 2910 1 DROP: 1; 3 SOLUTION/ DROPS OPHTHALMIC at 08:24

## 2018-01-06 RX ADMIN — ATORVASTATIN CALCIUM 40 MG: 40 TABLET, FILM COATED ORAL at 20:00

## 2018-01-06 RX ADMIN — ERYTHROMYCIN 1 G: 5 OINTMENT OPHTHALMIC at 08:24

## 2018-01-06 RX ADMIN — SENNOSIDES AND DOCUSATE SODIUM 1 TABLET: 8.6; 5 TABLET ORAL at 08:20

## 2018-01-06 RX ADMIN — HYDROMORPHONE HYDROCHLORIDE 2 MG: 2 TABLET ORAL at 15:06

## 2018-01-06 RX ADMIN — ERYTHROMYCIN 1 G: 5 OINTMENT OPHTHALMIC at 18:51

## 2018-01-06 RX ADMIN — HYDROMORPHONE HYDROCHLORIDE 2 MG: 2 TABLET ORAL at 06:35

## 2018-01-06 RX ADMIN — Medication 1 MG: at 21:18

## 2018-01-06 NOTE — PROGRESS NOTES
Pt arrived from Recovery room.  A&O, able to make needs known.  Oriented to room/unit.  Mild pain at this time.  Family at the bedside.

## 2018-01-06 NOTE — PLAN OF CARE
Problem: Patient Care Overview  Goal: Plan of Care/Patient Progress Review  PT: Orders received, chart reviewed, eval completed and treatment initiated. Pt is a 76 y/o male POD 1 R ORIF of greater trochanter fx, revision of JOSE; ant-lat hip precautions, no active ABD, to have hip abd brace donned at all times when OOB, TTWB to R LE.  Pt lives with wife at baseline with 2 landing stairs/no rails to enter, all needs can then be met on the main level, reports wife able to assist as needed.     Discharge Planner PT   Patient plan for discharge: Home, likely Monday, with HHPT progressing to OPPT  Current status: Pt educated on TTWBing precaution, use of brace at all times when up and acute PT role/POC during acute stay. Reports pain 4-5/10 at rest. Pt completes supine<>sit with CGA, sit<>stand with MinAx1 progressing to CGA, gait at EOB with FWW and MinAx1 to maintain TTWB. Fair tolerance with JOSE ex within precautions.   Barriers to return to prior living situation: current level of assist, pain, decreased activity tolerance, TTWB with mobility  Recommendations for discharge: TBD POD2  Rationale for recommendations: TBD POD2       Entered by: Love Kay 01/06/2018 9:33 AM

## 2018-01-06 NOTE — CODE/RAPID RESPONSE
[facility]  House [place]O Note:  Post-op Eye Pain    1/5/2018   Time Called: 2035    RRT called for: Post-op Eye Pain    Past Ophthalmic history significant for glaucoma, no eye surgery history.     - (+) corrective lenses   - not a contact lens wearer   - does not use artificial tears     Assessment & Plan   IMPRESSION & PLAN:    Post-op eye pain secondary to corneal desiccation:  Post-op L(OS) eye pain, burning, foreign body sensation.  -Proparacaine ophthalmic (0.5%) one drop.  Followed by Fluorescin eye exam to confirm desicaction versus foreign body.      -Erythromycin ophthalmic ointment (1.25 cm ribbon) now and QID while awake and QHS.  D/C after HS dose day of sx resolution.   -Diclofenac ophthalmic gtts prn QID for eye pain.  If pain not managed can try torodol vs bromfenac.    -ArtificialTears ointment QID PRN, may continue after symptom resolution.    -House Provider follow up in 24 hrs and PRN: Increasing/worsening symptoms, continued symptoms after 48 hrs or concerns/questions.   -No contact lenses until first day after sx resolution.  -After discharge from hospital advised patient to call PMD/ophthalmology.      INTERVENTIONS:  Proparacaine ophthalmic solution (0.5%)  --1 drop prior to exam (initial rapid onset pain control/anesthesia needed to facilitate exam).    Fluorescein dye application with NS from dye impregnated filter paper strip.   -- pt informed of possible yellow-orange coloration nasal secretions in next day    Discussed with and defer further cares to nursing staff    Interval History     Mitch Gabriel is a 77 year old male who was admitted on 1/5/2018 for right total hip arthroplasty with subsequent left eye pain postoperatively.    Past Medical History:   Diagnosis Date     Adenomatous colon polyp      Anterior pituitary hyperfunction (H)      Chronic kidney disease (CKD), stage II (mild)      Crohn disease (H)      Dermatochalasis      Glaucoma     both eyes      Hematuria      Hyperlipidemia      Hypertension      Hypertrophy of prostate      Lymphadenopathy      Macular degeneration      Pituitary adenoma (H)      Secondary renal hyperparathyroidism (H)      Sleep apnea     uses cpap     TMJ arthralgia      Venous insufficiency      Past Surgical History:   Procedure Laterality Date     COLONOSCOPY       ENT SURGERY      tonsillectomy     ORTHOPEDIC SURGERY      JOSE     TONSILLECTOMY       wisdom teeth removed       Code Status: Full Code    Car Rivera, APRN, CNP    Allergies   Allergies   Allergen Reactions     Codeine      Cucumber Extract        Physical Exam   Vital Signs with Ranges:  Temp:  [97  F (36.1  C)-98.9  F (37.2  C)] 98.3  F (36.8  C)  Pulse:  [75-81] 81  Heart Rate:  [60-78] 78  Resp:  [] 16  BP: (114-137)/(66-75) 130/74  SpO2:  [97 %-100 %] 97 %  I/O last 3 completed shifts:  In: 1000 [I.V.:1000]  Out: 250 [Urine:250]    Constitutional: Awake, alert, well-appearing male  EYE:  flora-orb - erythema noted to left eye     vision - reads newsprint 25 cm with corrective lenses     lid - erythema     fornices - nl--sup/inferior - no foreign body (superior lid not everted)      conj - palpebral-sup/inferior - pink    - bulbar - clear - no    sclera - nl - white/non-icteric     EOM - nl--EOMI without nystagmus     pupil  - PERRL -  ->  bilat brisk direct/consensual     cornea - fluorescein dye/ Wood's lamp - (+) dye staining -    - faint moderate intense - horizontal line 2 mm wide - 4-8  o'clock    - does not cross pupil/visual axis     AC - nl/clear (Anterior Chamber)    HEENT: Moist mucous membranes, normal dentition, conjunctiva and pupils examined and normal.  Respiratory: Regular expiratory rate, symmetrical chest expansion, trachea midline, normal work of breathing  Cardiovascular: Regular rate and rhythm, no JVD, appears well perfused  GI: Soft, non-distended, non-tender, normal bowel sounds.  Lymph/Hematologic: No anterior cervical or  supraclavicular adenopathy.  Skin: No rashes, no cyanosis, no edema.  Musculoskeletal: No joint swelling, erythema or tenderness.  Neurologic: Cranial nerves 2-12 intact, normal strength and sensation.  Psychiatric: Alert, oriented to person, place and time, no obvious anxiety or depression.    Data       Time Spent on this Encounter   I spent 45 minutes on the unit/floor managing the care of Mitch TINSLEY Nery. Over 50% of my time was spent counseling the patient and/or coordinating care regarding services listed in this note.

## 2018-01-06 NOTE — PROGRESS NOTES
"Mitch ALVINA Hatfieldjames  2018  POD # 1 sp revision tony    Admit Date:  2018      Doing well.  Normal healing wound.  No immediate surgical complications identified.  No excessive bleeding  Pain well-controlled.  Objective:  Blood pressure 113/59, pulse 71, temperature 98.3  F (36.8  C), temperature source Oral, resp. rate 16, height 1.791 m (5' 10.5\"), weight 96.3 kg (212 lb 3.2 oz), SpO2 97 %.    Temperatures:  Current - Temp: 98.3  F (36.8  C); Max - Temp  Av.2  F (36.8  C)  Min: 97  F (36.1  C)  Max: 98.9  F (37.2  C)  Pulse range: Pulse  Av.3  Min: 71  Max: 82  Blood pressure range: Systolic (24hrs), Av , Min:109 , Max:137   ; Diastolic (24hrs), Av, Min:59, Max:76    Exam:  CMS: intact  alert, stable, wound ok  Calf nontender b le.       Labs:  Recent Labs   Lab Test  18   1136   POTASSIUM  3.5     Recent Labs   Lab Test  18   0625   HGB  11.4*     No results for input(s): INR in the last 55734 hours.  No results for input(s): PLT in the last 02544 hours.    PLAN: Touch down weight bearing  Continue physical therapy  Pain control measures  Cont post-op routine.      "

## 2018-01-06 NOTE — PLAN OF CARE
Problem: Patient Care Overview  Goal: Plan of Care/Patient Progress Review  Pain managed w/ Tylenol. Refused to dangle at this time. Hip abductor in place. Tolerating clears. Auguste patent. Refused CPAP tonight. On Capno, O2 at 1L. Continue to monitor.

## 2018-01-06 NOTE — PROGRESS NOTES
" 01/06/18 0900   Quick Adds   Type of Visit Initial PT Evaluation   Living Environment   Lives With spouse   Living Arrangements house   Home Accessibility stairs to enter home   Number of Stairs to Enter Home 2  (landing stairs/no rails)   Number of Stairs Within Home 0   Transportation Available car;family or friend will provide  (pt drives at baseline, spouse can assist)   Self-Care   Dominant Hand right   Usual Activity Tolerance good   Current Activity Tolerance fair   Regular Exercise no   Equipment Currently Used at Home walker, rolling   Functional Level Prior   Ambulation 1-->assistive equipment   Transferring 1-->assistive equipment   Toileting 0-->independent   Bathing 0-->independent   Dressing 0-->independent   Eating 0-->independent   Communication 0-->understands/communicates without difficulty   Swallowing 0-->swallows foods/liquids without difficulty   Cognition 0 - no cognition issues reported   Fall history within last six months no   Which of the above functional risks had a recent onset or change? ambulation;transferring   General Information   Onset of Illness/Injury or Date of Surgery - Date 01/05/18   Referring Physician Sherri Cabrera MD   Patient/Family Goals Statement home with HHPT and progressing to OPPT, per Ortho likely d/c Monday   Pertinent History of Current Problem (include personal factors and/or comorbidities that impact the POC) POD1 R ORIF of greater trochanter fx, revision of JOSE   Precautions/Limitations fall precautions;right hip precautions   Weight-Bearing Status - RLE toe touch weight-bearing   General Observations Per Dr Cabrera, \"Patient is to be TTWB in the RLE with full anterolateral hip precautions. \"   General Info Comments Activity: ambulate with assist   Cognitive Status Examination   Orientation orientation to person, place and time   Level of Consciousness alert   Follows Commands and Answers Questions 100% of the time;able to follow multistep instructions "   Personal Safety and Judgment intact   Memory intact   Pain Assessment   Patient Currently in Pain Yes, see Vital Sign flowsheet  (4-5/10)   Integumentary/Edema   Integumentary/Edema Comments incision covered, no other deficits noted   Posture    Posture Forward head position;Protracted shoulders   Range of Motion (ROM)   ROM Comment L LE WFL, R LE WFL within appropriate hip precautions   Strength   Strength Comments L LE WFL; pt with decreased R LE strength with appropriate JOSE ex, requires CGA with heel slides   Bed Mobility   Bed Mobility Comments pt completes supine<>sit with CGA   Transfer Skills   Transfer Comments pt completes sit<>stand with MinAx1, bed surface elevated   Gait   Gait Comments pt takes forward steps at EOB with FWW with MinAx1, cues throughout for TTWB and requires increased time to complete for safe mobility, increased time in stance   Balance   Balance Comments pt with increased AD needs and reduced balance d/t TTWB, requiring increased assist   Sensory Examination   Sensory Perception no deficits were identified   Coordination   Coordination no deficits were identified   General Therapy Interventions   Planned Therapy Interventions balance training;bed mobility training;gait training;ROM;strengthening;transfer training;home program guidelines;progressive activity/exercise   Clinical Impression   Criteria for Skilled Therapeutic Intervention yes, treatment indicated   PT Diagnosis difficulty with gait   Influenced by the following impairments pain, decreased AROM/strength within hip precautions, decreased activity tolerance, decreased balance   Functional limitations due to impairments difficulty with bed mobility, transfers, gait, stairs   Clinical Presentation Evolving/Changing   Clinical Presentation Rationale decreased pain control, TTWB, use of hip abd brace- pt fearful of reinjury and falling   Clinical Decision Making (Complexity) Low complexity   Therapy Frequency` 2 times/day  "  Predicted Duration of Therapy Intervention (days/wks) 3 days   Anticipated Discharge Disposition Home with Home Therapy;Transitional Care Facility  (pending progression of IND with gait)   Risk & Benefits of therapy have been explained Yes   Patient, Family & other staff in agreement with plan of care Yes   [facility] TM \"6 Clicks\"   2016, Trustees of [facility], under license to [employer].  All rights reserved.   6 Clicks Short Forms Basic Mobility Inpatient Short Form   [facility]  \"6 Clicks\" V.2 Basic Mobility Inpatient Short Form   1. Turning from your back to your side while in a flat bed without using bedrails? 3 - A Little   2. Moving from lying on your back to sitting on the side of a flat bed without using bedrails? 3 - A Little   3. Moving to and from a bed to a chair (including a wheelchair)? 3 - A Little   4. Standing up from a chair using your arms (e.g., wheelchair, or bedside chair)? 3 - A Little   5. To walk in hospital room? 3 - A Little   6. Climbing 3-5 steps with a railing? 2 - A Lot   Basic Mobility Raw Score (Score out of 24.Lower scores equate to lower levels of function) 17   Total Evaluation Time   Total Evaluation Time (Minutes) 8     "

## 2018-01-06 NOTE — PROGRESS NOTES
[facility]  House [place] Note:  Post-op Eye Pain    ASSESSMENT/PLAN:  Post-operative eye pain secondary to corneal desiccation, resolved   House Officer called last evening for concern of post-operative eye pain secondary to corneal desiccation. Today Mr. Gabriel is endorsing complete resolution of left eye pain.  - discontinue erythromycin opthalmic ointment after HS dose today  - discontinue diclofenac opthalmic drops  - okay to continue PRN artificial tears for comfort    Please call us with additional questions or concerns.    RAYO Vazquez, CNP  Hospitalist Service, House Officer  [facility]     Text Page  Pager: [phone]

## 2018-01-06 NOTE — PLAN OF CARE
Problem: Patient Care Overview  Goal: Discharge Needs Assessment  Outcome: No Change  Alert and oriented. VSS with CPAP. Refusing capno. CMS intact. Dressing CDI. Reports mild pain, pain managed with scheduled tylenol. Refusing PRN pain medications. Dangled at bedside with assist of 1. Tolerating diet. Auguste patent.

## 2018-01-06 NOTE — ANESTHESIA POSTPROCEDURE EVALUATION
Patient: Mitch Gabriel    Procedure(s):  RIGHT OPEN REDUCTION INTERNAL FIXATION OF THE GREATER TROCH FRACTURE, REVISION TOTAL HIP ARTHROPLASTY - Wound Class: I-Clean    Diagnosis:RIGHT TOTAL KNEE HARDWARE LOOSENING   Diagnosis Additional Information: No value filed.    Anesthesia Type:  General, ETT    Note:  Anesthesia Post Evaluation    Patient location during evaluation: PACU  Patient participation: Able to fully participate in evaluation  Level of consciousness: awake  Pain management: adequate  Airway patency: patent  Cardiovascular status: acceptable  Respiratory status: acceptable  Hydration status: acceptable  PONV: none     Anesthetic complications: None          Last vitals:  Vitals:    01/05/18 1830 01/05/18 1840 01/05/18 1915   BP: 129/75 132/70 133/69   Resp: 15 14 15   Temp:   36.8  C (98.3  F)   SpO2: 97% 98% 97%         Electronically Signed By: Tamie Walton MD  January 5, 2018  7:53 PM

## 2018-01-07 ENCOUNTER — APPOINTMENT (OUTPATIENT)
Dept: PHYSICAL THERAPY | Facility: CLINIC | Age: 78
DRG: 466 | End: 2018-01-07
Attending: ORTHOPAEDIC SURGERY
Payer: MEDICARE

## 2018-01-07 ENCOUNTER — APPOINTMENT (OUTPATIENT)
Dept: OCCUPATIONAL THERAPY | Facility: CLINIC | Age: 78
DRG: 466 | End: 2018-01-07
Attending: ORTHOPAEDIC SURGERY
Payer: MEDICARE

## 2018-01-07 LAB
GLUCOSE SERPL-MCNC: 119 MG/DL (ref 70–99)
HGB BLD-MCNC: 10.9 G/DL (ref 13.3–17.7)

## 2018-01-07 PROCEDURE — 97116 GAIT TRAINING THERAPY: CPT | Mod: GP

## 2018-01-07 PROCEDURE — 97530 THERAPEUTIC ACTIVITIES: CPT | Mod: GP

## 2018-01-07 PROCEDURE — 97166 OT EVAL MOD COMPLEX 45 MIN: CPT | Mod: GO | Performed by: OCCUPATIONAL THERAPIST

## 2018-01-07 PROCEDURE — 40000133 ZZH STATISTIC OT WARD VISIT: Performed by: OCCUPATIONAL THERAPIST

## 2018-01-07 PROCEDURE — 82947 ASSAY GLUCOSE BLOOD QUANT: CPT | Performed by: ORTHOPAEDIC SURGERY

## 2018-01-07 PROCEDURE — 12000007 ZZH R&B INTERMEDIATE

## 2018-01-07 PROCEDURE — A9270 NON-COVERED ITEM OR SERVICE: HCPCS | Mod: GY | Performed by: ORTHOPAEDIC SURGERY

## 2018-01-07 PROCEDURE — 36415 COLL VENOUS BLD VENIPUNCTURE: CPT | Performed by: ORTHOPAEDIC SURGERY

## 2018-01-07 PROCEDURE — 25000132 ZZH RX MED GY IP 250 OP 250 PS 637: Mod: GY | Performed by: ORTHOPAEDIC SURGERY

## 2018-01-07 PROCEDURE — 97110 THERAPEUTIC EXERCISES: CPT | Mod: GP

## 2018-01-07 PROCEDURE — 97530 THERAPEUTIC ACTIVITIES: CPT | Mod: GO | Performed by: OCCUPATIONAL THERAPIST

## 2018-01-07 PROCEDURE — 97535 SELF CARE MNGMENT TRAINING: CPT | Mod: GO | Performed by: OCCUPATIONAL THERAPIST

## 2018-01-07 PROCEDURE — 85018 HEMOGLOBIN: CPT | Performed by: ORTHOPAEDIC SURGERY

## 2018-01-07 PROCEDURE — 40000193 ZZH STATISTIC PT WARD VISIT

## 2018-01-07 RX ADMIN — ASPIRIN 325 MG: 325 TABLET, DELAYED RELEASE ORAL at 21:31

## 2018-01-07 RX ADMIN — ACETAMINOPHEN 975 MG: 325 TABLET, FILM COATED ORAL at 13:25

## 2018-01-07 RX ADMIN — LOSARTAN POTASSIUM AND HYDROCHLOROTHIAZIDE 1 TABLET: 100; 12.5 TABLET, FILM COATED ORAL at 08:04

## 2018-01-07 RX ADMIN — ATORVASTATIN CALCIUM 40 MG: 40 TABLET, FILM COATED ORAL at 21:31

## 2018-01-07 RX ADMIN — ACETAMINOPHEN 975 MG: 325 TABLET, FILM COATED ORAL at 05:12

## 2018-01-07 RX ADMIN — HYDROMORPHONE HYDROCHLORIDE 2 MG: 2 TABLET ORAL at 01:15

## 2018-01-07 RX ADMIN — HYDROMORPHONE HYDROCHLORIDE 2 MG: 2 TABLET ORAL at 13:25

## 2018-01-07 RX ADMIN — ASPIRIN 325 MG: 325 TABLET, DELAYED RELEASE ORAL at 08:05

## 2018-01-07 RX ADMIN — ACETAMINOPHEN 975 MG: 325 TABLET, FILM COATED ORAL at 21:32

## 2018-01-07 RX ADMIN — SENNOSIDES AND DOCUSATE SODIUM 2 TABLET: 8.6; 5 TABLET ORAL at 21:31

## 2018-01-07 RX ADMIN — HYDROMORPHONE HYDROCHLORIDE 2 MG: 2 TABLET ORAL at 05:12

## 2018-01-07 RX ADMIN — METOPROLOL SUCCINATE 25 MG: 25 TABLET, EXTENDED RELEASE ORAL at 08:04

## 2018-01-07 RX ADMIN — Medication 1 MG: at 21:32

## 2018-01-07 RX ADMIN — HYDROMORPHONE HYDROCHLORIDE 2 MG: 2 TABLET ORAL at 09:17

## 2018-01-07 RX ADMIN — AMLODIPINE BESYLATE 10 MG: 10 TABLET ORAL at 21:33

## 2018-01-07 RX ADMIN — CHOLECALCIFEROL CAP 125 MCG (5000 UNIT) 5000 UNITS: 125 CAP at 08:04

## 2018-01-07 RX ADMIN — HYDROMORPHONE HYDROCHLORIDE 2 MG: 2 TABLET ORAL at 17:23

## 2018-01-07 RX ADMIN — SENNOSIDES AND DOCUSATE SODIUM 1 TABLET: 8.6; 5 TABLET ORAL at 08:05

## 2018-01-07 ASSESSMENT — ACTIVITIES OF DAILY LIVING (ADL): IADL_COMMENTS: SPOUSE CAN COMPLETE AS NEEDED

## 2018-01-07 NOTE — PLAN OF CARE
Problem: Patient Care Overview  Goal: Plan of Care/Patient Progress Review  Discharge Planner PT   Patient plan for discharge: Home, likely tomorrow  Current status: Pt completes supine<>sit with CGA from flat bed without bed rail, sit<>stand from elevated bed with CGA, wife present for family training and able to assist in donning/doffing hip abd brace. Ambulates with FWW and CGA progressing to SBA, maintains TTWBing throughout. Good tolerance for JOSE ex within precautions.   Barriers to return to prior living situation: pain, decreased activity tolerance, TTWB with mobility x6 weeks / hip abd brace x8 weeks when OOB  Recommendations for discharge: home with assist as needed, wife present for family training today x2 sessions  Rationale for recommendations: pt and wife demonstrate safe mobility and assist, stairs to be assessed in PM session, safe functional mobility in room demonstrated       Entered by: Love Kay 01/07/2018 1:19 PM     ADDENDUM: Stair training in PM session with wife, pt has 2 landing stairs to access front door at home, able to ascend/descend landing/platform step x3 reps each with MinAx1 at FWW for stability and CGA, maintains TTWB throughout.

## 2018-01-07 NOTE — PLAN OF CARE
Problem: Patient Care Overview  Goal: Plan of Care/Patient Progress Review  Outcome: No Change  A/OX4,Up with 1 assist/walker.Dreg CDI.Voiding per urinal.Pt own CPAP at night.IV SL.Voiding per urinal.Will continue to monitor.

## 2018-01-07 NOTE — PLAN OF CARE
Problem: Patient Care Overview  Goal: Plan of Care/Patient Progress Review  OT order received.  Evaluation completed, treatment initiated.  Patient is a 77 year old male who is s/p R ORIF of greater trochanter fx, revision of JOSE, with precautions of TTWB at all times, full sherie-lateral precautions, No hip abduction, hip abduction brace at all times except when in bed.  Patient lives with wife in a house where all needs are met on main level.  His wife is able to assist.    Discharge Planner OT   Patient plan for discharge: home with wife to assist  Current status: Courtney and VC supine <> sit.  Assist to don brace.  Courtney LE dressing with AE.  Courtney sit to stand.  SBA ambulation in room 20', using 2WW and a few rest breaks.  Toilet transfer to commode overlay SBA, VC for precautions. Pt has a RTS.  SBA urinal.  Courtney sit to supine.  Indep bed repositioning.  Fatigued by end of session, but pleased with progress.  Barriers to return to prior living situation: Assist needed for ADLs, transfers  Recommendations for discharge: home with wife to assist.  Rationale for recommendations: If pt continues to progress, wife is able to assist with ADLs at current level.       Entered by: ESMER SHORE 01/07/2018 9:35 AM

## 2018-01-07 NOTE — PROGRESS NOTES
"Mitch Gabriel  2018  POD # 2 sp revision tony    Admit Date:  2018      Doing well.  Normal healing wound.  No immediate surgical complications identified.  No excessive bleeding  Pain well-controlled.    Objective:  Blood pressure 113/67, pulse 74, temperature 97.7  F (36.5  C), temperature source Oral, resp. rate 16, height 1.791 m (5' 10.5\"), weight 96.3 kg (212 lb 3.2 oz), SpO2 96 %.    Temperatures:  Current - Temp: 98.3  F (36.8  C); Max - Temp  Av.2  F (36.8  C)  Min: 97  F (36.1  C)  Max: 98.9  F (37.2  C)  Pulse range: Pulse  Av.3  Min: 71  Max: 82  Blood pressure range: Systolic (24hrs), Av , Min:109 , Max:137   ; Diastolic (24hrs), Av, Min:59, Max:76    Exam:  CMS: intact  alert, stable, wound ok  Calf nontender b le.       Labs:  Recent Labs   Lab Test  18   1136   POTASSIUM  3.5     Recent Labs   Lab Test  18   0625   HGB  11.4*     No results for input(s): INR in the last 84061 hours.  No results for input(s): PLT in the last 10305 hours.    PLAN: Touch down weight bearing for 6 weeks  Hip abduction brace on when not sleeping for 8 weeks  Continue physical therapy  Pain control measures  Cont post-op routine.  Likely discharge tomorrow      "
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
## 2018-01-07 NOTE — PLAN OF CARE
Problem: Patient Care Overview  Goal: Plan of Care/Patient Progress Review  Outcome: Improving  A&O, anxious at times, vs stable except temp of 101.5 and 99.6.  Encouraging IS, coughing and deep breathing, CPAP on overnight.  Drsg CDI, cms intact, taking Tylenol and po dilaudid for pain control.  Voiding adequately using the urinal.  Will continue to monitor.
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
## 2018-01-08 ENCOUNTER — APPOINTMENT (OUTPATIENT)
Dept: OCCUPATIONAL THERAPY | Facility: CLINIC | Age: 78
DRG: 466 | End: 2018-01-08
Attending: ORTHOPAEDIC SURGERY
Payer: MEDICARE

## 2018-01-08 ENCOUNTER — APPOINTMENT (OUTPATIENT)
Dept: PHYSICAL THERAPY | Facility: CLINIC | Age: 78
DRG: 466 | End: 2018-01-08
Attending: ORTHOPAEDIC SURGERY
Payer: MEDICARE

## 2018-01-08 VITALS
OXYGEN SATURATION: 95 % | RESPIRATION RATE: 16 BRPM | HEART RATE: 67 BPM | DIASTOLIC BLOOD PRESSURE: 68 MMHG | BODY MASS INDEX: 29.71 KG/M2 | SYSTOLIC BLOOD PRESSURE: 117 MMHG | HEIGHT: 71 IN | WEIGHT: 212.2 LBS | TEMPERATURE: 98 F

## 2018-01-08 PROCEDURE — A9270 NON-COVERED ITEM OR SERVICE: HCPCS | Mod: GY | Performed by: ORTHOPAEDIC SURGERY

## 2018-01-08 PROCEDURE — 97530 THERAPEUTIC ACTIVITIES: CPT | Mod: GO | Performed by: OCCUPATIONAL THERAPY ASSISTANT

## 2018-01-08 PROCEDURE — 40000133 ZZH STATISTIC OT WARD VISIT: Performed by: OCCUPATIONAL THERAPY ASSISTANT

## 2018-01-08 PROCEDURE — 40000193 ZZH STATISTIC PT WARD VISIT

## 2018-01-08 PROCEDURE — 25000132 ZZH RX MED GY IP 250 OP 250 PS 637: Mod: GY | Performed by: ORTHOPAEDIC SURGERY

## 2018-01-08 PROCEDURE — 97116 GAIT TRAINING THERAPY: CPT | Mod: GP

## 2018-01-08 RX ORDER — HYDROMORPHONE HYDROCHLORIDE 2 MG/1
2-4 TABLET ORAL EVERY 4 HOURS PRN
Qty: 40 TABLET | Refills: 0 | Status: SHIPPED | OUTPATIENT
Start: 2018-01-08

## 2018-01-08 RX ORDER — AMOXICILLIN 250 MG
1-2 CAPSULE ORAL 2 TIMES DAILY
Qty: 40 TABLET | Refills: 0 | Status: SHIPPED | OUTPATIENT
Start: 2018-01-08

## 2018-01-08 RX ORDER — ASPIRIN 325 MG
325 TABLET, DELAYED RELEASE (ENTERIC COATED) ORAL 2 TIMES DAILY
Qty: 60 TABLET | Refills: 0 | Status: SHIPPED | OUTPATIENT
Start: 2018-01-08

## 2018-01-08 RX ORDER — HYDROXYZINE HYDROCHLORIDE 10 MG/1
10 TABLET, FILM COATED ORAL EVERY 6 HOURS PRN
Qty: 30 TABLET | Refills: 0 | Status: SHIPPED | OUTPATIENT
Start: 2018-01-08

## 2018-01-08 RX ORDER — ONDANSETRON 4 MG/1
4 TABLET, ORALLY DISINTEGRATING ORAL EVERY 6 HOURS PRN
Qty: 30 TABLET | Refills: 0 | Status: SHIPPED | OUTPATIENT
Start: 2018-01-08

## 2018-01-08 RX ADMIN — CHOLECALCIFEROL CAP 125 MCG (5000 UNIT) 5000 UNITS: 125 CAP at 08:41

## 2018-01-08 RX ADMIN — HYDROMORPHONE HYDROCHLORIDE 2 MG: 2 TABLET ORAL at 08:41

## 2018-01-08 RX ADMIN — SENNOSIDES AND DOCUSATE SODIUM 2 TABLET: 8.6; 5 TABLET ORAL at 08:41

## 2018-01-08 RX ADMIN — METOPROLOL SUCCINATE 25 MG: 25 TABLET, EXTENDED RELEASE ORAL at 08:41

## 2018-01-08 RX ADMIN — ACETAMINOPHEN 975 MG: 325 TABLET, FILM COATED ORAL at 05:28

## 2018-01-08 RX ADMIN — HYDROMORPHONE HYDROCHLORIDE 2 MG: 2 TABLET ORAL at 12:42

## 2018-01-08 RX ADMIN — LOSARTAN POTASSIUM AND HYDROCHLOROTHIAZIDE 1 TABLET: 100; 12.5 TABLET, FILM COATED ORAL at 08:40

## 2018-01-08 RX ADMIN — ASPIRIN 325 MG: 325 TABLET, DELAYED RELEASE ORAL at 08:41

## 2018-01-08 NOTE — PLAN OF CARE
Problem: Patient Care Overview  Goal: Plan of Care/Patient Progress Review  Outcome: Improving  Pt A&O. VSS. Up w A1, TTWB. Pain controlled with dilaudid. Pt to d/c today. Discharge instruction explained to pt and his wife. Questions and concerns answered. Pt belongings are with pt to home.

## 2018-01-08 NOTE — PLAN OF CARE
Problem: Patient Care Overview  Goal: Plan of Care/Patient Progress Review  Outcome: Improving  VSS on RA. Pain well-managed with ice, tylenol and dilaudid. CMS intact. Aquacel dressing intact. Up with Ax1 and walker, TTWB. Voiding per urinal. Reg diet. Plan d/c home tomorrow.

## 2018-01-08 NOTE — PROGRESS NOTES
"Mitch Gabriel  2018  POD # 3 s/p Rev RTHA  Admit Date:  2018      Doing well.  Objective: patient doing well overall. No complaints of pain. States he is ready to work with therapy again this morning and then will plan on going home this afternoon. Has been pleased overall with his hospital stay.   Blood pressure 111/67, pulse 67, temperature 97.7  F (36.5  C), temperature source Axillary, resp. rate 18, height 1.791 m (5' 10.5\"), weight 96.3 kg (212 lb 3.2 oz), SpO2 97 %.    Temperatures:  Current - Temp: 97.7  F (36.5  C); Max - Temp  Av  F (36.7  C)  Min: 97.5  F (36.4  C)  Max: 98.8  F (37.1  C)  Pulse range: Pulse  Av.3  Min: 67  Max: 78  Blood pressure range: Systolic (24hrs), Av , Min:111 , Max:125   ; Diastolic (24hrs), Av, Min:65, Max:72    Exam:  CMS: intact  alert, stable, wound ok  Dressing c/d/i  Calf s/nt  DF/PF 5/5 bilateral LE's  Communicates clearly with examiner    Labs:  Recent Labs   Lab Test  18   1136   POTASSIUM  3.5     Recent Labs   Lab Test  18   0615  18   0625   HGB  10.9*  11.4*     No results for input(s): INR in the last 37846 hours.  No results for input(s): PLT in the last 06415 hours.    PLAN: continue working with physical therapy and plan on discharge home this afternoon.  Maintain TTWB in the RLE with hip abduction brace on at all times except when in bed.   Maintain full anterolateral hip precautions with absolutely no hip abduction.     "

## 2018-01-08 NOTE — PLAN OF CARE
Problem: Patient Care Overview  Goal: Plan of Care/Patient Progress Review  Discharge Planner OT   Patient plan for discharge: home  Current status: Pt SBA supine to sit EOB, assist needed to don brace, amb slowly with FWW CGA/Jerrell maintaining TTWB to/from bathroom, educated on safety and AE when completing shower transfer, pt verbalized good understanding, reviewed AE needs, pt stated spouse will acquire in the community. Mod A for BLE to complete sit to supine.    Barriers to return to prior living situation: assist for ADLS, transfers  Recommendations for discharge: home with spouse assist as needed per plan established by the Occupational Therapist  Rationale for recommendations: Pt spouse to assist pt as needed with ADLS/IADLS, transfers       Entered by: Dee Fountain 01/08/2018 8:24 AM      Pt to d/c home today with spouse assist, GOALS NOT MET, see discharge summary

## 2018-01-08 NOTE — DISCHARGE SUMMARY
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  Procedures Performed: rev RTHA with orif of the greater trochanter fracture  Complications: None apparent   History of Present Illness: see operative report for full HPI  Past Medical History:   Past Medical History:   Diagnosis Date     Adenomatous colon polyp      Anterior pituitary hyperfunction (H)      Chronic kidney disease (CKD), stage II (mild)      Crohn disease (H)      Dermatochalasis      Glaucoma     both eyes     Hematuria      Hyperlipidemia      Hypertension      Hypertrophy of prostate      Lymphadenopathy      Macular degeneration      Pituitary adenoma (H)      Secondary renal hyperparathyroidism (H)      Sleep apnea     uses cpap     TMJ arthralgia      Venous insufficiency      Patient Active Problem List   Diagnosis     S/P revision of total hip     Past Surgical History:   Procedure Laterality Date     COLONOSCOPY       ENT SURGERY      tonsillectomy     ORTHOPEDIC SURGERY      JOSE     TONSILLECTOMY       wisdom teeth removed       Social History     Social History     Marital status:      Spouse name: N/A     Number of children: N/A     Years of education: N/A     Occupational History     Not on file.     Social History Main Topics     Smoking status: Former Smoker     Packs/day: 0.00     Types: Pipe, Cigars     Quit date: 1/1/1983     Smokeless tobacco: Not on file     Alcohol use Yes      Comment: a glass of red wine once a week     Drug use: No     Sexual activity: Not on file     Other Topics Concern     Not on file     Social History Narrative     Medications on admission:   Prescriptions Prior to Admission   Medication Sig Dispense  Refill Last Dose     CALCIUM PO Take 250 mg by mouth daily   12/30/2017 at AM     triamcinolone (KENALOG) 0.1 % cream Apply topically 2 times daily as needed for irritation   12/29/2017 at PRN     Acetaminophen (TYLENOL ARTHRITIS PAIN PO) Take 1,300 mg by mouth 3 times daily as needed   12/30/2017 at PRN     ciclopirox (LOPROX) 0.77 % cream Apply topically daily as needed   12/29/2017 at PRN     mometasone (ELOCON) 0.1 % cream Apply topically 2 times daily as needed   12/29/2017 at PRN     Atorvastatin Calcium (LIPITOR PO) Take 40 mg by mouth every evening    1/4/2018 at PM     Metoprolol Succinate (TOPROL XL PO) Take 25 mg by mouth daily   1/5/2018 at 0730     losartan-hydrochlorothiazide (HYZAAR) 100-12.5 MG per tablet Take 1 tablet by mouth daily   1/5/2018 at 0730     AmLODIPine Besylate (NORVASC PO) Take 10 mg by mouth every evening    1/4/2018 at PM     VITAMIN D, CHOLECALCIFEROL, PO Take 5,000 Units by mouth daily   12/29/2017 at AM     multivitamin, therapeutic with minerals (MULTI-VITAMIN) TABS tablet Take 1 tablet by mouth daily   12/29/2017 at AM     Multiple Vitamins-Minerals (PRESERVISION/LUTEIN PO) Take 2 tablets by mouth daily   12/29/2017 at AM     Sildenafil Citrate (VIAGRA PO) Take 100 mg by mouth daily as needed   Past Month at PRN     Omega-3 Fatty Acids (FISH OIL PO) Take 360 mg by mouth daily   12/29/2017 at AM     [DISCONTINUED] ASPIRIN PO Take 81 mg by mouth daily   12/29/2017 at AM     Allergies:    Allergies   Allergen Reactions     Codeine      [place] Extract        Hospital Course: Patient was admitted to Orthopedics and brought to the OR where he underwent the above named procedure. Postoperatively he did very well with no apparent complications, and he had an uneventful hospital stay. Ancef was given for 24 hours after surgery. He was given aspirin for DVT prophylaxis and his pain was well controlled. He transitioned to oral pain medications during his hospital stay. He progressed  well with physical therapy and discharge home was recommended. Patient is to maintain his TTWB status and full anterolateral hip precautions at all times until further notice  Consultations Obtained During Hospitalization:  2. Physical Therapy for gait training, mobilization, range of motion and strengthening exercises  3. Occupational Therapy for activities of daily living.  4. Social Work for disposition planning.  Active Problems:    S/P revision of total hip    Discharge Disposition: patient doing well and discharge home recommended   Discharge Diet: resume pre operative diet  Discharge Medications:   Current Discharge Medication List      START taking these medications    Details   HYDROmorphone (DILAUDID) 2 MG tablet Take 1-2 tablets (2-4 mg) by mouth every 4 hours as needed for moderate to severe pain  Qty: 40 tablet, Refills: 0    Associated Diagnoses: S/P revision of total hip      hydrOXYzine (ATARAX) 10 MG tablet Take 1 tablet (10 mg) by mouth every 6 hours as needed for itching  Qty: 30 tablet, Refills: 0    Associated Diagnoses: S/P revision of total hip      ondansetron (ZOFRAN-ODT) 4 MG ODT tab Take 1 tablet (4 mg) by mouth every 6 hours as needed for nausea or vomiting  Qty: 30 tablet, Refills: 0    Associated Diagnoses: S/P revision of total hip      senna-docusate (SENOKOT-S;PERICOLACE) 8.6-50 MG per tablet Take 1-2 tablets by mouth 2 times daily  Qty: 40 tablet, Refills: 0    Associated Diagnoses: S/P revision of total hip         CONTINUE these medications which have CHANGED    Details   aspirin  MG EC tablet Take 1 tablet (325 mg) by mouth 2 times daily  Qty: 60 tablet, Refills: 0    Associated Diagnoses: S/P revision of total hip         CONTINUE these medications which have NOT CHANGED    Details   CALCIUM PO Take 250 mg by mouth daily      triamcinolone (KENALOG) 0.1 % cream Apply topically 2 times daily as needed for irritation      Acetaminophen (TYLENOL ARTHRITIS PAIN PO) Take 1,300  mg by mouth 3 times daily as needed      ciclopirox (LOPROX) 0.77 % cream Apply topically daily as needed      mometasone (ELOCON) 0.1 % cream Apply topically 2 times daily as needed      Atorvastatin Calcium (LIPITOR PO) Take 40 mg by mouth every evening       Metoprolol Succinate (TOPROL XL PO) Take 25 mg by mouth daily      losartan-hydrochlorothiazide (HYZAAR) 100-12.5 MG per tablet Take 1 tablet by mouth daily      AmLODIPine Besylate (NORVASC PO) Take 10 mg by mouth every evening       VITAMIN D, CHOLECALCIFEROL, PO Take 5,000 Units by mouth daily      multivitamin, therapeutic with minerals (MULTI-VITAMIN) TABS tablet Take 1 tablet by mouth daily      Multiple Vitamins-Minerals (PRESERVISION/LUTEIN PO) Take 2 tablets by mouth daily      Sildenafil Citrate (VIAGRA PO) Take 100 mg by mouth daily as needed      Omega-3 Fatty Acids (FISH OIL PO) Take 360 mg by mouth daily           Code Status: full code  X-rays needed at the follow up visit: AP pelvis and crosstable of the Right hip.   Jenaro Recinos PA-C  1/8/2018 7:57 AM   JUAN  Andra  [phone]

## 2018-01-08 NOTE — PLAN OF CARE
Problem: Patient Care Overview  Goal: Plan of Care/Patient Progress Review  Discharge Planner PT   Patient plan for discharge: Home, pt reports home therapies and progressing to OP PT per discussion with surgeon pre-op, discussed with SW and CC  Current status: Pt completes bed mobility and sit<>stand transfers with SBA, assist for donning/doffing brace, ambulation with FWW and close SBA with TTWB to R LE. Pt ascends/descends 1 landing stair x3 reps with CGA and MinAx1 at FWW for stability. Pt to discharge home in PM, no further acute PT needs.   Barriers to return to prior living situation: non, wife participated in family training  Recommendations for discharge: home with HHPT, would benefit from home safety eval, assist from wife as needed  Rationale for recommendations: pt reports pre-op plan included home cares, would continue to benefit for home safety assessment and evaluation of mobility in his own environment with continued therapies as needed, to continue JOSE HEP.       Entered by: Love Kay 01/08/2018 11:57 AM     Physical Therapy Discharge Summary    Reason for therapy discharge:    Discharged to home with home therapy.    Progress towards therapy goal(s). See [facility] on [facility] for goal details.  Goals partially met.  Barriers to achieving goals:   discharge from facility and SBA provided with transfers and bed mobility, does not require phsyical assist .    Therapy recommendation(s):    Continued therapy is recommended.  Rationale/Recommendations:  see above.

## 2018-01-08 NOTE — PLAN OF CARE
Problem: Patient Care Overview  Goal: Plan of Care/Patient Progress Review  Outcome: Improving  A&O, vs stable, cms intact, drsg CDI.  Denied pain, had scheduled Tylenol, voiding adequately using urinal.  Progressing per plan of care.

## 2018-01-08 NOTE — PLAN OF CARE
Problem: Patient Care Overview  Goal: Plan of Care/Patient Progress Review  Outcome: Improving  PT A&O RA, vss , cms intact, Aquacel dressing on R hip  CDI.  Denied pain, had scheduled Tylenol, voiding adequately per urinal.  Use CPAP at night. Plan to D/c home this morning.

## 2024-03-28 ENCOUNTER — APPOINTMENT (OUTPATIENT)
Dept: CT IMAGING | Facility: HOSPITAL | Age: 84
End: 2024-03-28
Attending: EMERGENCY MEDICINE
Payer: MEDICARE

## 2024-03-28 ENCOUNTER — HOSPITAL ENCOUNTER (EMERGENCY)
Facility: HOSPITAL | Age: 84
Discharge: HOME | End: 2024-03-28
Attending: EMERGENCY MEDICINE | Admitting: EMERGENCY MEDICINE
Payer: MEDICARE

## 2024-03-28 VITALS
SYSTOLIC BLOOD PRESSURE: 148 MMHG | BODY MASS INDEX: 31.12 KG/M2 | RESPIRATION RATE: 20 BRPM | TEMPERATURE: 98.2 F | OXYGEN SATURATION: 98 % | DIASTOLIC BLOOD PRESSURE: 70 MMHG | WEIGHT: 220 LBS | HEART RATE: 60 BPM

## 2024-03-28 DIAGNOSIS — R10.9 LEFT FLANK PAIN: ICD-10-CM

## 2024-03-28 LAB
ALBUMIN UR-MCNC: 10 MG/DL
ANION GAP SERPL CALCULATED.3IONS-SCNC: 9 MMOL/L (ref 7–15)
APPEARANCE UR: CLEAR
BASOPHILS # BLD AUTO: 0.1 10E3/UL (ref 0–0.2)
BASOPHILS NFR BLD AUTO: 1 %
BILIRUB UR QL STRIP: NEGATIVE
BUN SERPL-MCNC: 25.6 MG/DL (ref 8–23)
CALCIUM SERPL-MCNC: 9.6 MG/DL (ref 8.8–10.2)
CHLORIDE SERPL-SCNC: 100 MMOL/L (ref 98–107)
COLOR UR AUTO: ABNORMAL
CREAT SERPL-MCNC: 1.11 MG/DL (ref 0.67–1.17)
DEPRECATED HCO3 PLAS-SCNC: 29 MMOL/L (ref 22–29)
EGFRCR SERPLBLD CKD-EPI 2021: 67 ML/MIN/1.73M2
EOSINOPHIL # BLD AUTO: 0.4 10E3/UL (ref 0–0.7)
EOSINOPHIL NFR BLD AUTO: 5 %
ERYTHROCYTE [DISTWIDTH] IN BLOOD BY AUTOMATED COUNT: 13.2 % (ref 10–15)
GLUCOSE SERPL-MCNC: 101 MG/DL (ref 70–99)
GLUCOSE UR STRIP-MCNC: NEGATIVE MG/DL
HCT VFR BLD AUTO: 43 % (ref 40–53)
HGB BLD-MCNC: 14.5 G/DL (ref 13.3–17.7)
HGB UR QL STRIP: ABNORMAL
HOLD SPECIMEN: NORMAL
HOLD SPECIMEN: NORMAL
IMM GRANULOCYTES # BLD: 0 10E3/UL
IMM GRANULOCYTES NFR BLD: 1 %
KETONES UR STRIP-MCNC: NEGATIVE MG/DL
LEUKOCYTE ESTERASE UR QL STRIP: NEGATIVE
LYMPHOCYTES # BLD AUTO: 1.1 10E3/UL (ref 0.8–5.3)
LYMPHOCYTES NFR BLD AUTO: 14 %
MCH RBC QN AUTO: 31.7 PG (ref 26.5–33)
MCHC RBC AUTO-ENTMCNC: 33.7 G/DL (ref 31.5–36.5)
MCV RBC AUTO: 94 FL (ref 78–100)
MONOCYTES # BLD AUTO: 0.6 10E3/UL (ref 0–1.3)
MONOCYTES NFR BLD AUTO: 8 %
NEUTROPHILS # BLD AUTO: 5.7 10E3/UL (ref 1.6–8.3)
NEUTROPHILS NFR BLD AUTO: 71 %
NITRATE UR QL: NEGATIVE
NRBC # BLD AUTO: 0 10E3/UL
NRBC BLD AUTO-RTO: 0 /100
PH UR STRIP: 6.5 [PH] (ref 5–7)
PLATELET # BLD AUTO: 176 10E3/UL (ref 150–450)
POTASSIUM SERPL-SCNC: 4.1 MMOL/L (ref 3.4–5.3)
RBC # BLD AUTO: 4.58 10E6/UL (ref 4.4–5.9)
RBC URINE: 4 /HPF
SODIUM SERPL-SCNC: 138 MMOL/L (ref 135–145)
SP GR UR STRIP: 1.01 (ref 1–1.03)
SQUAMOUS EPITHELIAL: <1 /HPF
UROBILINOGEN UR STRIP-MCNC: <2 MG/DL
WBC # BLD AUTO: 7.9 10E3/UL (ref 4–11)
WBC URINE: <1 /HPF

## 2024-03-28 PROCEDURE — 96374 THER/PROPH/DIAG INJ IV PUSH: CPT

## 2024-03-28 PROCEDURE — 74176 CT ABD & PELVIS W/O CONTRAST: CPT | Mod: MG

## 2024-03-28 PROCEDURE — 99285 EMERGENCY DEPT VISIT HI MDM: CPT | Mod: 25

## 2024-03-28 PROCEDURE — 80048 BASIC METABOLIC PNL TOTAL CA: CPT | Performed by: EMERGENCY MEDICINE

## 2024-03-28 PROCEDURE — 250N000011 HC RX IP 250 OP 636: Performed by: EMERGENCY MEDICINE

## 2024-03-28 PROCEDURE — 36415 COLL VENOUS BLD VENIPUNCTURE: CPT | Performed by: EMERGENCY MEDICINE

## 2024-03-28 PROCEDURE — 81003 URINALYSIS AUTO W/O SCOPE: CPT | Performed by: EMERGENCY MEDICINE

## 2024-03-28 PROCEDURE — 85025 COMPLETE CBC W/AUTO DIFF WBC: CPT | Performed by: EMERGENCY MEDICINE

## 2024-03-28 RX ORDER — KETOROLAC TROMETHAMINE 15 MG/ML
15 INJECTION, SOLUTION INTRAMUSCULAR; INTRAVENOUS ONCE
Status: COMPLETED | OUTPATIENT
Start: 2024-03-28 | End: 2024-03-28

## 2024-03-28 RX ORDER — ONDANSETRON 4 MG/1
4 TABLET, ORALLY DISINTEGRATING ORAL ONCE
Status: DISCONTINUED | OUTPATIENT
Start: 2024-03-28 | End: 2024-03-28 | Stop reason: HOSPADM

## 2024-03-28 RX ORDER — ACETAMINOPHEN 325 MG/1
975 TABLET ORAL ONCE
Status: DISCONTINUED | OUTPATIENT
Start: 2024-03-28 | End: 2024-03-28 | Stop reason: HOSPADM

## 2024-03-28 RX ADMIN — KETOROLAC TROMETHAMINE 15 MG: 15 INJECTION, SOLUTION INTRAMUSCULAR; INTRAVENOUS at 17:42

## 2024-03-28 ASSESSMENT — ACTIVITIES OF DAILY LIVING (ADL): ADLS_ACUITY_SCORE: 36

## 2024-03-28 ASSESSMENT — COLUMBIA-SUICIDE SEVERITY RATING SCALE - C-SSRS
1. IN THE PAST MONTH, HAVE YOU WISHED YOU WERE DEAD OR WISHED YOU COULD GO TO SLEEP AND NOT WAKE UP?: NO
6. HAVE YOU EVER DONE ANYTHING, STARTED TO DO ANYTHING, OR PREPARED TO DO ANYTHING TO END YOUR LIFE?: NO
2. HAVE YOU ACTUALLY HAD ANY THOUGHTS OF KILLING YOURSELF IN THE PAST MONTH?: NO

## 2024-03-28 NOTE — ED TRIAGE NOTES
Pt reports had sudden L sided flank pain that started yesterday, since that time pain has traveled to L groin and has consistently gotten worse.  Pt reports he has hx of having a kidney stone in 1976 but nothing since then.       Triage Assessment (Adult)       Row Name 03/28/24 4234          Triage Assessment    Airway WDL WDL        Respiratory WDL    Respiratory WDL WDL        Skin Circulation/Temperature WDL    Skin Circulation/Temperature WDL WDL        Cardiac WDL    Cardiac WDL WDL        Peripheral/Neurovascular WDL    Peripheral Neurovascular WDL WDL        Cognitive/Neuro/Behavioral WDL    Cognitive/Neuro/Behavioral WDL WDL

## 2024-03-28 NOTE — ED PROVIDER NOTES
EMERGENCY DEPARTMENT ENCOUNTER     NAME: Mitch Gabriel   AGE: 83 year old male   YOB: 1940   MRN: [id number]   EVALUATION DATE & TIME: No admission date for patient encounter.   PCP: Aguilar Wesley     Chief Complaint   Patient presents with    Abdominal Pain    Flank Pain          :    FINAL IMPRESSION       1. Left flank pain           ED COURSE & MEDICAL DECISION MAKING      Pertinent Labs & Imaging studies reviewed. (See chart for details)   83 year old male  presents to the Emergency Department for evaluation of left-sided flank pain wrapping around to the left lower quadrant. Initial Vitals Reviewed. Initial exam notable for patient who actually appears quite comfortable when I saw him.  He has some mild left CVA tenderness and a nonperitoneal abdomen.  He denies any urinary symptoms, nausea vomiting, GI symptoms.  He does have a history of a previous kidney stone in 1976.  He also has a history of diverticulosis but I am less suspicious of diverticulitis with no GI symptoms.  Labs do not show any renal dysfunction or leukocytosis.  Urinalysis shows some hematuria but with no signs of infection.  CT scan reviewed interpreted myself does not show a stone and was read by radiology as negative.  Ultimately, he looks a lot better now and I think what probably happened is he actually passed the stone just before coming in because he actually looked quite well by the time he got here but had had 9 out of 10 pain at home before that.  I discussed all of this with his family and at this time they are comfortable with this probable hypothesis and he will be discharged with instructions for home supportive care and return precautions.           At the conclusion of the encounter I discussed the results of all of the tests and the disposition. The questions were answered. The patient or family acknowledged understanding and was agreeable with the care plan.     0 minutes critical care time, see  procedure note below for details if relevant  5:11 PM I met with the patient for the initial interview and physical examination. Discussed plan for treatment and workup in the ED.     6:54 PM We discussed the plan for discharge and the patient is agreeable. Reviewed supportive cares, symptomatic treatment, outpatient follow up, and reasons to return to the Emergency Department. All questions and concerns were addressed. Patient to be discharged by ED RN.      Medical Decision Making    History:  Supplemental history from: Documented in chart, family members  External Record(s) reviewed: Documented in chart    Work Up:  Chart documentation includes differential considered and any EKGs or imaging independently interpreted by provider, where specified.  In additional to work up documented, I considered the following work up: Documented in chart, if applicable.    External consultation:  Discussion of management with another provider: Documented in chart, if applicable    Complicating factors:  Care impacted by chronic illness: Chronic Kidney Disease, Hyperlipidemia, Hypertension, and Other: pituitary adenoma, Crohn's disease  Care affected by social determinants of health: N/A    Disposition considerations: Discharge. No recommendations on prescription strength medication(s). See documentation for any additional details.,  I considered admission but ultimately discharged the patient with reassuring workup        MEDICATIONS GIVEN IN THE EMERGENCY:   Medications   ondansetron (ZOFRAN ODT) ODT tab 4 mg (0 mg Oral Hold 3/28/24 1748)   dimenhyDRINATE chew tab 50 mg (has no administration in time range)   acetaminophen (TYLENOL) tablet 975 mg (0 mg Oral Hold 3/28/24 1715)   ketorolac (TORADOL) injection 15 mg (15 mg Intravenous $Given 3/28/24 1742)      NEW PRESCRIPTIONS STARTED AT TODAY'S ER VISIT   New Prescriptions    No medications on file     ================================================================   HISTORY OF  PRESENT ILLNESS       Patient information was obtained from: patient    Use of Intrepreter: N/A    Mitch Gabriel is a 83 year old male with history of CKD2, hypertension, hyperlipidemia, pituitary adenoma, Crohn's disease who presents with flank pain.     Patient reports left flank pain radiating down into his LLQ. Has a history of kidney stone and diverticulosis. Does not remember how it feels for a kidney stone, does not think it feels like diverticulosis. Denies diarrhea, bloody stool, urinary symptoms, nausea, or additional symptoms or complaints at this time.      ================================================================        PAST HISTORY     PAST MEDICAL HISTORY:   Past Medical History:   Diagnosis Date    Adenomatous colon polyp     Anterior pituitary hyperfunction (H24)     Chronic kidney disease (CKD), stage II (mild)     Crohn disease (H)     Dermatochalasis     Glaucoma     both eyes    Hematuria     Hyperlipidemia     Hypertension     Hypertrophy of prostate     Lymphadenopathy     Macular degeneration     Pituitary adenoma (H)     Secondary renal hyperparathyroidism (H24)     Sleep apnea     uses cpap    TMJ arthralgia     Venous insufficiency       PAST SURGICAL HISTORY:   Past Surgical History:   Procedure Laterality Date    ARTHROPLASTY REVISION HIP Right 1/5/2018    Procedure: ARTHROPLASTY REVISION HIP;  RIGHT OPEN REDUCTION INTERNAL FIXATION OF THE GREATER TROCH FRACTURE, REVISION TOTAL HIP ARTHROPLASTY;  Surgeon: Sherri Cabrera MD;  Location: [facility] OR    COLONOSCOPY      ENT SURGERY      tonsillectomy    JOINT REPLACEMENT      right hip    ORTHOPEDIC SURGERY      JOSE    PROSTATE SURGERY      TONSILLECTOMY      wisdom teeth removed        CURRENT MEDICATIONS:   Acetaminophen (TYLENOL ARTHRITIS PAIN PO)  AmLODIPine Besylate (NORVASC PO)  aspirin  MG EC tablet  Atorvastatin Calcium (LIPITOR PO)  CALCIUM PO  ciclopirox (LOPROX) 0.77 % cream  HYDROmorphone (DILAUDID) 2 MG  tablet  hydrOXYzine (ATARAX) 10 MG tablet  losartan-hydrochlorothiazide (HYZAAR) 100-12.5 MG per tablet  Metoprolol Succinate (TOPROL XL PO)  mometasone (ELOCON) 0.1 % cream  Multiple Vitamins-Minerals (PRESERVISION/LUTEIN PO)  multivitamin, therapeutic with minerals (MULTI-VITAMIN) TABS tablet  Omega-3 Fatty Acids (FISH OIL PO)  ondansetron (ZOFRAN-ODT) 4 MG ODT tab  senna-docusate (SENOKOT-S;PERICOLACE) 8.6-50 MG per tablet  Sildenafil Citrate (VIAGRA PO)  triamcinolone (KENALOG) 0.1 % cream  VITAMIN D, CHOLECALCIFEROL, PO      ALLERGIES:   Allergies   Allergen Reactions    Codeine     [place] Extract       FAMILY HISTORY:   History reviewed. No pertinent family history.   SOCIAL HISTORY:   Social History     Socioeconomic History    Marital status:    Tobacco Use    Smoking status: Former     Packs/day: 0     Types: Pipe, Cigars, Cigarettes     Quit date: 1983     Years since quittin.2   Substance and Sexual Activity    Alcohol use: Yes     Comment: a glass of red wine once a week    Drug use: No        VITALS  Patient Vitals for the past 24 hrs:   BP Temp Temp src Pulse Resp SpO2 Weight   24 1645 (!) 196/83 98.2  F (36.8  C) Temporal 67 20 98 % 99.8 kg (220 lb)        ================================================================    PHYSICAL EXAM     VITAL SIGNS: BP (!) 196/83   Pulse 67   Temp 98.2  F (36.8  C) (Temporal)   Resp 20   Wt 99.8 kg (220 lb)   SpO2 98%   BMI 31.12 kg/m     Constitutional:  Awake, no acute distress   HENT:  Atraumatic, oropharynx without exudate or erythema, membranes moist  Lymph:  No adenopathy  Eyes: EOM intact, PERRL, no injection  Neck: Supple  Respiratory:  Clear to auscultation bilaterally, no wheezes or crackles   Cardiovascular:  Regular rate and rhythm, single S1 and S2   GI:  Soft, nondistended, no rebound or guarding . Mild Left CVA and LLQ tenderness.  Musculoskeletal:  Moves all extremities, no lower extremity edema, no deformities     Skin:  Warm, dry  Neurologic:  Alert and oriented x3, no focal deficits noted       ================================================================  LAB       All pertinent labs reviewed and interpreted.   Labs Ordered and Resulted from Time of ED Arrival to Time of ED Departure   ROUTINE UA WITH MICROSCOPIC REFLEX TO CULTURE - Abnormal       Result Value    Color Urine Light Yellow      Appearance Urine Clear      Glucose Urine Negative      Bilirubin Urine Negative      Ketones Urine Negative      Specific Gravity Urine 1.015      Blood Urine 0.03 mg/dL (*)     pH Urine 6.5      Protein Albumin Urine 10 (*)     Urobilinogen Urine <2.0      Nitrite Urine Negative      Leukocyte Esterase Urine Negative      RBC Urine 4 (*)     WBC Urine <1      Squamous Epithelials Urine <1     BASIC METABOLIC PANEL - Abnormal    Sodium 138      Potassium 4.1      Chloride 100      Carbon Dioxide (CO2) 29      Anion Gap 9      Urea Nitrogen 25.6 (*)     Creatinine 1.11      GFR Estimate 67      Calcium 9.6      Glucose 101 (*)    CBC WITH PLATELETS AND DIFFERENTIAL    WBC Count 7.9      RBC Count 4.58      Hemoglobin 14.5      Hematocrit 43.0      MCV 94      MCH 31.7      MCHC 33.7      RDW 13.2      Platelet Count 176      % Neutrophils 71      % Lymphocytes 14      % Monocytes 8      % Eosinophils 5      % Basophils 1      % Immature Granulocytes 1      NRBCs per 100 WBC 0      Absolute Neutrophils 5.7      Absolute Lymphocytes 1.1      Absolute Monocytes 0.6      Absolute Eosinophils 0.4      Absolute Basophils 0.1      Absolute Immature Granulocytes 0.0      Absolute NRBCs 0.0          ===============================================================  RADIOLOGY       Reviewed all pertinent imaging. Please see official radiology report.   Abd/pelvis CT no contrast - Stone Protocol   Final Result   IMPRESSION:    1.  No renal calculi or hydronephrosis.    2.  Small cyst right kidney and probable cyst left kidney. Ultrasound  recommended to further characterize.    3.  Extensive sigmoid diverticulosis, without evidence for diverticulitis.   4.  No other findings to account for the patient's flank pain.   5.  Small pericardial effusion.               ================================================================  EKG         I have independently reviewed and interpreted the EKG(s) documented above.     ================================================================  PROCEDURES         I, Marie Boo, am serving as a scribe to document services personally performed by Dr. Kramer based on my observation and the provider's statements to me. I, Lakesha Kramer MD attest that Marie Boo is acting in a scribe capacity, has observed my performance of the services and has documented them in accordance with my direction.   Lakesha Kramer M.D.   Emergency Medicine   [facility] EMERGENCY DEPARTMENT  [facility] [facility] [address]  [phone]  Dept: [phone]      Lakesha Kramer MD  03/28/24 4641

## 2024-03-28 NOTE — DISCHARGE INSTRUCTIONS
As we discussed, the CT scan does not show a stone or any other problems, there are no signs of urine infection but there is blood.  What most likely happened is you passed a kidney stone and that is why you are starting to feel improved.

## (undated) DEVICE — PREP SKIN SCRUB TRAY 4461A

## (undated) DEVICE — SOLUTION WOUND CLEANSING 3/4OZ 10% PVP EA-L3011FB-50

## (undated) DEVICE — GLOVE PROTEXIS W/NEU-THERA 7.5  2D73TE75

## (undated) DEVICE — BLADE SAW SAGITTAL STRK 25X79.5X1.24MM 4/2000 2108-318-000

## (undated) DEVICE — GLOVE PROTEXIS MICRO 8.0  2D73PM80

## (undated) DEVICE — DRSG KERLIX FLUFFS X5

## (undated) DEVICE — SOL NACL 0.9% IRRIG 1000ML BOTTLE 07138-09

## (undated) DEVICE — WIPES FOLEY CARE SURESTEP PROVON DFC100

## (undated) DEVICE — PAD FOAM MCGUIRE KIT 0814-0150

## (undated) DEVICE — MANIFOLD NEPTUNE 4 PORT 700-20

## (undated) DEVICE — SU VICRYL 0 CT-1 27" J340H

## (undated) DEVICE — SU MONOCRYL 3-0 PS-2 27" Y427H

## (undated) DEVICE — CATH TRAY FOLEY COUDE SURESTEP 16FR W/URNE MTR STLK A304716A

## (undated) DEVICE — GLOVE PROTEXIS W/NEU-THERA 8.0  2D73TE80

## (undated) DEVICE — GLOVE PROTEXIS BLUE W/NEU-THERA 8.0  2D73EB80

## (undated) DEVICE — PACK TOTAL HIP W/POUCH SOP15HPFSM

## (undated) DEVICE — DRAPE IOBAN INCISE 23X17" 6650EZ

## (undated) DEVICE — ESU GROUND PAD UNIVERSAL W/O CORD

## (undated) DEVICE — SU ETHIBOND 2 V-37 4X30" MX69G

## (undated) DEVICE — SU ETHIBOND 0 CTX CR  8X18" CX31D

## (undated) DEVICE — LINEN TOWEL PACK X5 5464

## (undated) DEVICE — PREP CHLORAPREP 26ML TINTED ORANGE  260815

## (undated) DEVICE — IMM PILLOW ABDUCT HIP MED 31143061

## (undated) DEVICE — SUCTION IRR SYSTEM W/O TIP INTERPULSE HANDPIECE 0210-100-000

## (undated) RX ORDER — HYDROMORPHONE HYDROCHLORIDE 1 MG/ML
INJECTION, SOLUTION INTRAMUSCULAR; INTRAVENOUS; SUBCUTANEOUS
Status: DISPENSED
Start: 2018-01-05

## (undated) RX ORDER — BUPIVACAINE HYDROCHLORIDE AND EPINEPHRINE 5; 5 MG/ML; UG/ML
INJECTION, SOLUTION EPIDURAL; INTRACAUDAL; PERINEURAL
Status: DISPENSED
Start: 2018-01-05

## (undated) RX ORDER — CEFAZOLIN SODIUM 2 G/100ML
INJECTION, SOLUTION INTRAVENOUS
Status: DISPENSED
Start: 2018-01-05

## (undated) RX ORDER — DEXAMETHASONE SODIUM PHOSPHATE 4 MG/ML
INJECTION, SOLUTION INTRA-ARTICULAR; INTRALESIONAL; INTRAMUSCULAR; INTRAVENOUS; SOFT TISSUE
Status: DISPENSED
Start: 2018-01-05

## (undated) RX ORDER — CEFAZOLIN SODIUM 1 G/3ML
INJECTION, POWDER, FOR SOLUTION INTRAMUSCULAR; INTRAVENOUS
Status: DISPENSED
Start: 2018-01-05

## (undated) RX ORDER — PROPOFOL 10 MG/ML
INJECTION, EMULSION INTRAVENOUS
Status: DISPENSED
Start: 2018-01-05

## (undated) RX ORDER — FENTANYL CITRATE 50 UG/ML
INJECTION, SOLUTION INTRAMUSCULAR; INTRAVENOUS
Status: DISPENSED
Start: 2018-01-05

## (undated) RX ORDER — ALBUMIN, HUMAN INJ 5% 5 %
SOLUTION INTRAVENOUS
Status: DISPENSED
Start: 2018-01-05

## (undated) RX ORDER — VECURONIUM BROMIDE 1 MG/ML
INJECTION, POWDER, LYOPHILIZED, FOR SOLUTION INTRAVENOUS
Status: DISPENSED
Start: 2018-01-05

## (undated) RX ORDER — ROPIVACAINE HYDROCHLORIDE 2 MG/ML
INJECTION, SOLUTION EPIDURAL; INFILTRATION; PERINEURAL
Status: DISPENSED
Start: 2018-01-05

## (undated) RX ORDER — ONDANSETRON 2 MG/ML
INJECTION INTRAMUSCULAR; INTRAVENOUS
Status: DISPENSED
Start: 2018-01-05

## (undated) RX ORDER — ACYCLOVIR 200 MG/1
CAPSULE ORAL
Status: DISPENSED
Start: 2018-01-05

## (undated) RX ORDER — KETOROLAC TROMETHAMINE 30 MG/ML
INJECTION, SOLUTION INTRAMUSCULAR; INTRAVENOUS
Status: DISPENSED
Start: 2018-01-05

## (undated) RX ORDER — LIDOCAINE HYDROCHLORIDE 20 MG/ML
INJECTION, SOLUTION EPIDURAL; INFILTRATION; INTRACAUDAL; PERINEURAL
Status: DISPENSED
Start: 2018-01-05